# Patient Record
Sex: FEMALE | Race: WHITE | NOT HISPANIC OR LATINO | Employment: FULL TIME | URBAN - METROPOLITAN AREA
[De-identification: names, ages, dates, MRNs, and addresses within clinical notes are randomized per-mention and may not be internally consistent; named-entity substitution may affect disease eponyms.]

---

## 2017-01-24 ENCOUNTER — TELEPHONE (OUTPATIENT)
Dept: NEUROLOGY | Facility: CLINIC | Age: 42
End: 2017-01-24

## 2017-01-24 ENCOUNTER — COMMUNICATION - HEALTHEAST (OUTPATIENT)
Dept: NEUROLOGY | Facility: CLINIC | Age: 42
End: 2017-01-24

## 2017-01-24 DIAGNOSIS — S06.9XAA TBI (TRAUMATIC BRAIN INJURY) (H): ICD-10-CM

## 2017-01-24 NOTE — TELEPHONE ENCOUNTER
PREVISIT INFORMATION                                                    Carlie YAIR Whitaker scheduled for future visit at [facility].    Patient is scheduled to see Maryana Gallagher MD on 1/26/17  Reason for visit: Concussion 12/12/16, history of TBI  Referring provider ER provider  Has patient seen previous specialist? ED record says she's seeing TBI specialist at [facility].  Medical Records:  Unable to reach patient to ask about medical records.    REVIEW                                                        Current Outpatient Prescriptions   Medication Sig Dispense Refill     Lisdexamfetamine Dimesylate (VYVANSE PO) Take 30 mg by mouth daily       HYDROcodone-acetaminophen (NORCO) 5-325 MG per tablet Take 1-2 tablets by mouth every 4 hours as needed for moderate to severe pain 10 tablet 0       Allergies: Erythromycin        PLAN/FOLLOW-UP NEEDED                                                      Previsit review incomplete.  Listed phone number is out of service.  Patient will see provider at future scheduled appointment and we will attempt to get records at that time.

## 2017-02-28 ENCOUNTER — COMMUNICATION - HEALTHEAST (OUTPATIENT)
Dept: NEUROLOGY | Facility: CLINIC | Age: 42
End: 2017-02-28

## 2017-02-28 DIAGNOSIS — S06.9XAA TBI (TRAUMATIC BRAIN INJURY) (H): ICD-10-CM

## 2017-03-09 ENCOUNTER — AMBULATORY - HEALTHEAST (OUTPATIENT)
Dept: SPEECH THERAPY | Age: 42
End: 2017-03-09

## 2017-03-23 ENCOUNTER — HOSPITAL ENCOUNTER (OUTPATIENT)
Dept: NEUROLOGY | Facility: CLINIC | Age: 42
Setting detail: THERAPIES SERIES
Discharge: STILL ADMITTED OR ADMINISTRATIVE | End: 2017-03-23
Attending: NURSE PRACTITIONER

## 2017-03-23 DIAGNOSIS — F41.1 ANXIETY STATE: ICD-10-CM

## 2017-03-23 DIAGNOSIS — F07.81 POST CONCUSSION SYNDROME: ICD-10-CM

## 2017-03-23 DIAGNOSIS — F09 COGNITIVE DISORDER: ICD-10-CM

## 2017-03-23 DIAGNOSIS — F39 EPISODIC MOOD DISORDER (H): ICD-10-CM

## 2017-03-23 DIAGNOSIS — G47.00 INSOMNIA, UNSPECIFIED: ICD-10-CM

## 2017-03-30 ENCOUNTER — COMMUNICATION - HEALTHEAST (OUTPATIENT)
Dept: NEUROLOGY | Facility: CLINIC | Age: 42
End: 2017-03-30

## 2017-03-30 DIAGNOSIS — S06.9XAA TBI (TRAUMATIC BRAIN INJURY) (H): ICD-10-CM

## 2017-04-25 ENCOUNTER — COMMUNICATION - HEALTHEAST (OUTPATIENT)
Dept: NEUROLOGY | Facility: CLINIC | Age: 42
End: 2017-04-25

## 2017-04-25 DIAGNOSIS — G47.00 INSOMNIA, UNSPECIFIED: ICD-10-CM

## 2017-04-26 ENCOUNTER — COMMUNICATION - HEALTHEAST (OUTPATIENT)
Dept: NEUROLOGY | Facility: CLINIC | Age: 42
End: 2017-04-26

## 2017-04-26 DIAGNOSIS — S06.9XAA TBI (TRAUMATIC BRAIN INJURY) (H): ICD-10-CM

## 2017-05-02 ENCOUNTER — AMBULATORY - HEALTHEAST (OUTPATIENT)
Dept: NEUROLOGY | Facility: CLINIC | Age: 42
End: 2017-05-02

## 2017-05-27 ENCOUNTER — APPOINTMENT (OUTPATIENT)
Dept: CT IMAGING | Facility: CLINIC | Age: 42
End: 2017-05-27
Attending: FAMILY MEDICINE
Payer: COMMERCIAL

## 2017-05-27 ENCOUNTER — HOSPITAL ENCOUNTER (EMERGENCY)
Facility: CLINIC | Age: 42
Discharge: HOME | End: 2017-05-27
Attending: FAMILY MEDICINE | Admitting: FAMILY MEDICINE
Payer: COMMERCIAL

## 2017-05-27 ENCOUNTER — APPOINTMENT (OUTPATIENT)
Dept: GENERAL RADIOLOGY | Facility: CLINIC | Age: 42
End: 2017-05-27
Attending: FAMILY MEDICINE
Payer: COMMERCIAL

## 2017-05-27 VITALS
TEMPERATURE: 98.1 F | HEART RATE: 94 BPM | RESPIRATION RATE: 16 BRPM | SYSTOLIC BLOOD PRESSURE: 99 MMHG | OXYGEN SATURATION: 99 % | DIASTOLIC BLOOD PRESSURE: 59 MMHG

## 2017-05-27 DIAGNOSIS — E87.6 HYPOKALEMIA: ICD-10-CM

## 2017-05-27 DIAGNOSIS — N10 ACUTE PYELONEPHRITIS: ICD-10-CM

## 2017-05-27 DIAGNOSIS — M54.59 MECHANICAL LOW BACK PAIN: ICD-10-CM

## 2017-05-27 DIAGNOSIS — E86.0 DEHYDRATION: ICD-10-CM

## 2017-05-27 LAB
ALBUMIN SERPL-MCNC: 2.2 G/DL (ref 3.4–5)
ALBUMIN UR-MCNC: 30 MG/DL
ALP SERPL-CCNC: 115 U/L (ref 40–150)
ALT SERPL W P-5'-P-CCNC: 19 U/L (ref 0–50)
ANION GAP SERPL CALCULATED.3IONS-SCNC: 7 MMOL/L (ref 3–14)
APPEARANCE UR: ABNORMAL
AST SERPL W P-5'-P-CCNC: 19 U/L (ref 0–45)
BACTERIA #/AREA URNS HPF: ABNORMAL /HPF
BASOPHILS # BLD AUTO: 0 10E9/L (ref 0–0.2)
BASOPHILS NFR BLD AUTO: 0.2 %
BILIRUB SERPL-MCNC: 0.2 MG/DL (ref 0.2–1.3)
BILIRUB UR QL STRIP: NEGATIVE
BUN SERPL-MCNC: 14 MG/DL (ref 7–30)
CALCIUM SERPL-MCNC: 9.4 MG/DL (ref 8.5–10.1)
CHLORIDE SERPL-SCNC: 97 MMOL/L (ref 94–109)
CO2 SERPL-SCNC: 32 MMOL/L (ref 20–32)
COLOR UR AUTO: YELLOW
CREAT SERPL-MCNC: 1.15 MG/DL (ref 0.52–1.04)
DIFFERENTIAL METHOD BLD: ABNORMAL
EOSINOPHIL # BLD AUTO: 0.1 10E9/L (ref 0–0.7)
EOSINOPHIL NFR BLD AUTO: 0.8 %
ERYTHROCYTE [DISTWIDTH] IN BLOOD BY AUTOMATED COUNT: 12.1 % (ref 10–15)
GFR SERPL CREATININE-BSD FRML MDRD: 52 ML/MIN/1.7M2
GLUCOSE SERPL-MCNC: 111 MG/DL (ref 70–99)
GLUCOSE UR STRIP-MCNC: NEGATIVE MG/DL
HCG UR QL: NEGATIVE
HCT VFR BLD AUTO: 34.6 % (ref 35–47)
HGB BLD-MCNC: 11.4 G/DL (ref 11.7–15.7)
HGB UR QL STRIP: NEGATIVE
IMM GRANULOCYTES # BLD: 0.1 10E9/L (ref 0–0.4)
IMM GRANULOCYTES NFR BLD: 0.5 %
KETONES UR STRIP-MCNC: NEGATIVE MG/DL
LEUKOCYTE ESTERASE UR QL STRIP: ABNORMAL
LIPASE SERPL-CCNC: 89 U/L (ref 73–393)
LYMPHOCYTES # BLD AUTO: 1.4 10E9/L (ref 0.8–5.3)
LYMPHOCYTES NFR BLD AUTO: 11.2 %
MCH RBC QN AUTO: 29.5 PG (ref 26.5–33)
MCHC RBC AUTO-ENTMCNC: 32.9 G/DL (ref 31.5–36.5)
MCV RBC AUTO: 89 FL (ref 78–100)
MONOCYTES # BLD AUTO: 1.9 10E9/L (ref 0–1.3)
MONOCYTES NFR BLD AUTO: 15.4 %
MUCOUS THREADS #/AREA URNS LPF: PRESENT /LPF
NEUTROPHILS # BLD AUTO: 9 10E9/L (ref 1.6–8.3)
NEUTROPHILS NFR BLD AUTO: 71.9 %
NITRATE UR QL: NEGATIVE
PH UR STRIP: 6.5 PH (ref 5–7)
PLATELET # BLD AUTO: 301 10E9/L (ref 150–450)
POTASSIUM SERPL-SCNC: 2.8 MMOL/L (ref 3.4–5.3)
POTASSIUM SERPL-SCNC: 3.2 MMOL/L (ref 3.4–5.3)
PROT SERPL-MCNC: 6 G/DL (ref 6.8–8.8)
RBC # BLD AUTO: 3.87 10E12/L (ref 3.8–5.2)
RBC #/AREA URNS AUTO: 3 /HPF (ref 0–2)
SODIUM SERPL-SCNC: 136 MMOL/L (ref 133–144)
SP GR UR STRIP: 1.01 (ref 1–1.03)
SQUAMOUS #/AREA URNS AUTO: 18 /HPF (ref 0–1)
URN SPEC COLLECT METH UR: ABNORMAL
UROBILINOGEN UR STRIP-MCNC: NORMAL MG/DL (ref 0–2)
WBC # BLD AUTO: 12.5 10E9/L (ref 4–11)
WBC #/AREA URNS AUTO: 25 /HPF (ref 0–2)

## 2017-05-27 PROCEDURE — 84132 ASSAY OF SERUM POTASSIUM: CPT | Performed by: FAMILY MEDICINE

## 2017-05-27 PROCEDURE — 99285 EMERGENCY DEPT VISIT HI MDM: CPT | Performed by: FAMILY MEDICINE

## 2017-05-27 PROCEDURE — 87186 SC STD MICRODIL/AGAR DIL: CPT | Performed by: FAMILY MEDICINE

## 2017-05-27 PROCEDURE — 96365 THER/PROPH/DIAG IV INF INIT: CPT

## 2017-05-27 PROCEDURE — 25000128 H RX IP 250 OP 636: Performed by: FAMILY MEDICINE

## 2017-05-27 PROCEDURE — 83690 ASSAY OF LIPASE: CPT | Performed by: FAMILY MEDICINE

## 2017-05-27 PROCEDURE — 96366 THER/PROPH/DIAG IV INF ADDON: CPT

## 2017-05-27 PROCEDURE — 74176 CT ABD & PELVIS W/O CONTRAST: CPT

## 2017-05-27 PROCEDURE — 85025 COMPLETE CBC W/AUTO DIFF WBC: CPT | Performed by: FAMILY MEDICINE

## 2017-05-27 PROCEDURE — 80053 COMPREHEN METABOLIC PANEL: CPT | Performed by: FAMILY MEDICINE

## 2017-05-27 PROCEDURE — 96375 TX/PRO/DX INJ NEW DRUG ADDON: CPT

## 2017-05-27 PROCEDURE — 71020 XR CHEST 2 VW: CPT

## 2017-05-27 PROCEDURE — 81025 URINE PREGNANCY TEST: CPT | Performed by: FAMILY MEDICINE

## 2017-05-27 PROCEDURE — 81001 URINALYSIS AUTO W/SCOPE: CPT | Performed by: FAMILY MEDICINE

## 2017-05-27 PROCEDURE — 99285 EMERGENCY DEPT VISIT HI MDM: CPT | Mod: 25

## 2017-05-27 PROCEDURE — 87088 URINE BACTERIA CULTURE: CPT | Performed by: FAMILY MEDICINE

## 2017-05-27 PROCEDURE — 25000132 ZZH RX MED GY IP 250 OP 250 PS 637: Performed by: FAMILY MEDICINE

## 2017-05-27 PROCEDURE — 87086 URINE CULTURE/COLONY COUNT: CPT | Performed by: FAMILY MEDICINE

## 2017-05-27 RX ORDER — CEFTRIAXONE 1 G/1
1 INJECTION, POWDER, FOR SOLUTION INTRAMUSCULAR; INTRAVENOUS EVERY 24 HOURS
Status: DISCONTINUED | OUTPATIENT
Start: 2017-05-27 | End: 2017-05-27 | Stop reason: HOSPADM

## 2017-05-27 RX ORDER — POTASSIUM CHLORIDE 1.5 G/1.58G
20-40 POWDER, FOR SOLUTION ORAL
Status: DISCONTINUED | OUTPATIENT
Start: 2017-05-27 | End: 2017-05-27 | Stop reason: HOSPADM

## 2017-05-27 RX ORDER — CIPROFLOXACIN 500 MG/1
500 TABLET, FILM COATED ORAL 2 TIMES DAILY
COMMUNITY
Start: 2017-05-25 | End: 2017-06-04

## 2017-05-27 RX ORDER — POTASSIUM CL/LIDO/0.9 % NACL 10MEQ/0.1L
10 INTRAVENOUS SOLUTION, PIGGYBACK (ML) INTRAVENOUS
Status: DISCONTINUED | OUTPATIENT
Start: 2017-05-27 | End: 2017-05-27 | Stop reason: HOSPADM

## 2017-05-27 RX ORDER — LORAZEPAM 0.5 MG/1
.5-1 TABLET ORAL
COMMUNITY
Start: 2017-04-28

## 2017-05-27 RX ORDER — TRAZODONE HYDROCHLORIDE 50 MG/1
1 TABLET, FILM COATED ORAL AT BEDTIME
COMMUNITY
Start: 2017-03-23

## 2017-05-27 RX ORDER — KETOROLAC TROMETHAMINE 15 MG/ML
15 INJECTION, SOLUTION INTRAMUSCULAR; INTRAVENOUS ONCE
Status: COMPLETED | OUTPATIENT
Start: 2017-05-27 | End: 2017-05-27

## 2017-05-27 RX ADMIN — SODIUM CHLORIDE, POTASSIUM CHLORIDE, SODIUM LACTATE AND CALCIUM CHLORIDE 1000 ML: 600; 310; 30; 20 INJECTION, SOLUTION INTRAVENOUS at 14:00

## 2017-05-27 RX ADMIN — POTASSIUM CHLORIDE 40 MEQ: 1.5 POWDER, FOR SOLUTION ORAL at 16:38

## 2017-05-27 RX ADMIN — CEFTRIAXONE 1 G: 1 INJECTION, POWDER, FOR SOLUTION INTRAMUSCULAR; INTRAVENOUS at 16:49

## 2017-05-27 RX ADMIN — KETOROLAC TROMETHAMINE 15 MG: 15 INJECTION, SOLUTION INTRAMUSCULAR; INTRAVENOUS at 16:11

## 2017-05-27 NOTE — ED PROVIDER NOTES
"  History     Chief Complaint   Patient presents with     Flank Pain     left sided flank pain, on antibiotiucs for UTI. no improvement.     HPI  Carlie Montejo is a 41 year old female with a history of a pelvic fracture and chronic pain (on Percocet) who presents to the ED for evaluation of left lower back and flank pain. The patient reports that pain began in her left lower back six days ago. This pain has since traveled to her left flank region. She continues to experience chills, a fever, tachycardia, shortness of breath, and chest tightness. Her temperature was being checked at home and was found to be 102 degrees at its highest and about 100 degrees at its lowest last night. The patient reports that she feels dehydrated but will wake up in the morning \"wet with sweat\". She also states that she occasionally will feel like she has to urinate and then isn't able to go. The patient adds that she has not had a bowel movement for four days; she gave herself an enema to go at that time. She began taking Cipro one week ago and has had five doses. Her LMP began on 5/12/17 and was on time and normal.The patient denies any previous abdominal surgeries.     Past Medical History   No past medical history on file.    Problem List  There is no problem list on file for this patient.      Past Surgical History  No past surgical history on file.    Social History  Social History     Social History     Marital status:      Spouse name: N/A     Number of children: N/A     Years of education: N/A     Occupational History     Not on file.     Social History Main Topics     Smoking status: Not on file     Smokeless tobacco: Not on file     Alcohol use Not on file     Drug use: Not on file     Sexual activity: Not on file     Other Topics Concern     Not on file     Social History Narrative     I have reviewed the Medications, Allergies, Past Medical and Surgical History, and Social History in the Epic system.    Review of " Systems     All other systems are reviewed and are negative    Physical Exam   BP: 97/63  Pulse: 94  Temp: 98.1  F (36.7  C)  Resp: 18  SpO2: 97 %  Physical Exam     Nursing note and vitals were reviewed.  Constitutional: Awake and alert, thin but otherwise appearing 41-year-old in moderate discomfort, who does not appear acutely ill, and who answers questions appropriately and cooperates with examination.  HEENT: EOMI. Oropharynx normal.  Neck: Freely mobile.  Cardiovascular: Cardiac examination reveals normal heart rate and regular rhythm without murmur.  Pulmonary/Chest: Breathing is unlabored.  Breath sounds are clear and equal bilaterally.  There no retractions, tachypnea, rales, wheezes, or rhonchi.  Abdomen: Soft, nontender, no HSM or masses rebound or guarding.  No CVA tenderness.  Musculoskeletal: Extremities are warm and well-perfused and without edema.. Back examination reveals full range of motion in 6 directions with discomfort on range of motion in all directions felt in the left flank.  Spinal curvatures are normal.  No tenderness to palpation or percussion.  Neurological: Alert, oriented, thought content logical, coherent   Skin: Warm, dry, no rashes.  Psychiatric: Affect broad and appropriate.        ED Course     ED Course     Procedures             Critical Care time:  none                    Results for orders placed or performed during the hospital encounter of 05/27/17 (from the past 24 hour(s))   UA reflex to Microscopic   Result Value Ref Range    Color Urine Yellow     Appearance Urine Slightly Cloudy     Glucose Urine Negative NEG mg/dL    Bilirubin Urine Negative NEG    Ketones Urine Negative NEG mg/dL    Specific Gravity Urine 1.014 1.003 - 1.035    Blood Urine Negative NEG    pH Urine 6.5 5.0 - 7.0 pH    Protein Albumin Urine 30 (A) NEG mg/dL    Urobilinogen mg/dL Normal 0.0 - 2.0 mg/dL    Nitrite Urine Negative NEG    Leukocyte Esterase Urine Moderate (A) NEG    Source Unspecified Urine      RBC Urine 3 (H) 0 - 2 /HPF    WBC Urine 25 (H) 0 - 2 /HPF    Bacteria Urine Few (A) NEG /HPF    Squamous Epithelial /HPF Urine 18 (H) 0 - 1 /HPF    Mucous Urine Present (A) NEG /LPF   HCG qualitative urine   Result Value Ref Range    HCG Qual Urine Negative NEG   CBC with platelets differential   Result Value Ref Range    WBC 12.5 (H) 4.0 - 11.0 10e9/L    RBC Count 3.87 3.8 - 5.2 10e12/L    Hemoglobin 11.4 (L) 11.7 - 15.7 g/dL    Hematocrit 34.6 (L) 35.0 - 47.0 %    MCV 89 78 - 100 fl    MCH 29.5 26.5 - 33.0 pg    MCHC 32.9 31.5 - 36.5 g/dL    RDW 12.1 10.0 - 15.0 %    Platelet Count 301 150 - 450 10e9/L    Diff Method Automated Method     % Neutrophils 71.9 %    % Lymphocytes 11.2 %    % Monocytes 15.4 %    % Eosinophils 0.8 %    % Basophils 0.2 %    % Immature Granulocytes 0.5 %    Absolute Neutrophil 9.0 (H) 1.6 - 8.3 10e9/L    Absolute Lymphocytes 1.4 0.8 - 5.3 10e9/L    Absolute Monocytes 1.9 (H) 0.0 - 1.3 10e9/L    Absolute Eosinophils 0.1 0.0 - 0.7 10e9/L    Absolute Basophils 0.0 0.0 - 0.2 10e9/L    Abs Immature Granulocytes 0.1 0 - 0.4 10e9/L   Comprehensive metabolic panel   Result Value Ref Range    Sodium 136 133 - 144 mmol/L    Potassium 2.8 (L) 3.4 - 5.3 mmol/L    Chloride 97 94 - 109 mmol/L    Carbon Dioxide 32 20 - 32 mmol/L    Anion Gap 7 3 - 14 mmol/L    Glucose 111 (H) 70 - 99 mg/dL    Urea Nitrogen 14 7 - 30 mg/dL    Creatinine 1.15 (H) 0.52 - 1.04 mg/dL    GFR Estimate 52 (L) >60 mL/min/1.7m2    GFR Estimate If Black 63 >60 mL/min/1.7m2    Calcium 9.4 8.5 - 10.1 mg/dL    Bilirubin Total 0.2 0.2 - 1.3 mg/dL    Albumin 2.2 (L) 3.4 - 5.0 g/dL    Protein Total 6.0 (L) 6.8 - 8.8 g/dL    Alkaline Phosphatase 115 40 - 150 U/L    ALT 19 0 - 50 U/L    AST 19 0 - 45 U/L   Lipase   Result Value Ref Range    Lipase 89 73 - 393 U/L   CT Abdomen Pelvis w/o Contrast (stone protocol)    Narrative    Exam: CT ABDOMEN PELVIS W/O CONTRAST  5/27/2017 3:11 PM    History: Left flank pain.    Comparison:  None    Technique: Volumetric acquisition with reconstruction in the axial,  coronal planes through the abdomen and pelvis without contrast.  Radiation dose for this scan was reduced using automated exposure  control, adjustment of the mA and/or kV according to patient size, or  iterative reconstruction technique.    Contrast: None    Findings:   Lung Bases: Mild dependent atelectasis. No pleural or pericardial  effusion.    Abdomen: Unenhanced liver, spleen, adrenal glands, pancreas are  normal. Gallbladder is decompressed.    Cyst in the posterior interpolar region of the right kidney, this cyst  contains layering stones. Kidneys are otherwise normal. Specifically,  no renal or ureteral calculi, no hydronephrosis or hydroureter.    Small amount of free fluid in the pelvis. No areas of bowel wall  thickening or bowel dilatation.    Bones: No concerning lytic or sclerotic lesions. Postoperative changes  of left sacroiliac arthrodesis. Surgical screw appears intact.      Impression    Impression: Small amount of free fluid in the pelvis, likely  physiologic. Otherwise normal unenhanced CT of the abdomen and pelvis  without findings to suggest an etiology of the patient's symptoms.    JOSE GUADALUPE QUINTANA   XR Chest 2 Views    Narrative    XR CHEST 2 VW   5/27/2017 4:30 PM     HISTORY: fever, dyspnea    COMPARISON: None.    FINDINGS: The heart is negative.  There is slight blunting of the  costophrenic angles on the lateral view. This may be due to a small  amount of pleural fluid. Mild platelike atelectasis is seen in the  lung bases. The pulmonary vasculature is normal.  The bones and soft  tissues are unremarkable.      Impression    IMPRESSION:   1. Slight blunting of the costophrenic angles, suspect small amount of  pleural fluid.  2. Mild basilar platelike atelectasis.    This can also be seen on the  recent chest CT.   Potassium   Result Value Ref Range    Potassium 3.2 (L) 3.4 - 5.3 mmol/L       Medications   lactated  ringers BOLUS 1,000 mL (0 mLs Intravenous Stopped 5/27/17 1609)     Followed by   lactated ringers BOLUS 1,000 mL (not administered)   potassium chloride (KLOR-CON) Packet 20-40 mEq (40 mEq Oral or Feeding Tube Given 5/27/17 1638)   potassium chloride 10 mEq in 100 mL intermittent infusion with 10 mg lidocaine (not administered)   cefTRIAXone (ROCEPHIN) 1 g vial to attach to  mL bag for ADULTS or NS 50 mL bag for PEDS (0 g Intravenous Stopped 5/27/17 1731)   ketorolac (TORADOL) injection 15 mg (15 mg Intravenous Given 5/27/17 1611)       13:23 PM Patient assessed. Course of care outlined.  1600: Patient reassessed.  Symptoms largely unchanged.  Test results reviewed.  Urine collection is of poor quality with multiple squamous epithelial cells but there is still leukocyte esterase and pyuria.  CT scan shows no evidence of pyelonephritis or obstructing kidney stone.  Discussed findings of nonobstructive kidney stones in the right renal cyst.  She does have some shortness of breath and has been having persistent fevers and I think we should expand the evaluation and to include a chest x-ray to rule out pneumonia. I discussed with her that I still believe that she has myofascial back pain in addition to probably a kidney infection but there is evidence that that is improving.     She also has mild hypokalemia and says she has been having leg cramps.  She denies that she's been dieting excessively.  She says she has not been eating as much because of work stress.  She has not been having vomiting or diarrhea.  She says she has not had problems with low potassium in the past but then also states that she could tell her potassium was low because she was having leg cramps.  We will give her potassium supplements and additional IV fluids.     Assessments & Plan (with Medical Decision Making)     41-year-old female presented with persistent left flank pain in the setting of recent possible urinary tract infection.   Unfortunately urinalyses have been a poor quality with high amounts of squamous epithelial cells and no culture had been performed.  There is no CT evidence of pyelonephritis and no evidence of obstruction of the urinary tract.  There is incidental finding of kidney stones in the right renal cyst unrelated to current complaints.  There is clearly a myofascial component to the back pain with pain elicited by range of motion.  Symptomatic care was outlined for that with Tylenol and ibuprofen.  I do not find evidence of treatment failure for pyelonephritis, if it is present.  We did obtain urine for another culture.  She also has hypokalemia.  She says that she's had poor oral intake and thinks she's been dehydrated from not eating enough.  Her ADHD medication may be suppressing her appetite excessively.  I recommended a balanced diet and high potassium foods.  She received potassium supplements here.  Her potassium improved from 2.8 to 3.2.  I've asked her to have it rechecked at the end of next week in her primary care clinic.  She received a dose of Rocephin on the emergency department for possible urinary tract infection and will continue on ciprofloxacin, of which she has an additional 7 day course.  She should be seen if she has fevers about 100.4 after 48 hours or other new concerning symptoms.    I have reviewed the nursing notes.    I have reviewed the findings, diagnosis, plan and need for follow up with the patient.    Discharge Medication List as of 5/27/2017  6:30 PM          Final diagnoses:   Mechanical low back pain   Dehydration   Hypokalemia   Acute pyelonephritis     This document serves as a record of the services and decisions personally performed and made by Wenceslao Fregoso MD. It was created on HIS/HER behalf by Giovana Vale, a trained medical scribe. The creation of this document is based the provider's statements to the medical scribe.  Giovana Vale 1:22 PM 5/27/2017    Provider:   The  information in this document, created by the medical scribe for me, accurately reflects the services I personally performed and the decisions made by me. I have reviewed and approved this document for accuracy prior to leaving the patient care area.  Wenceslao Fregoso MD 1:22 PM 5/27/2017 5/27/2017   [facility] EMERGENCY DEPARTMENT     Wenceslao Fregoso MD  05/27/17 184

## 2017-05-27 NOTE — ED AVS SNAPSHOT
[facility] Emergency Department    [address] [address]    Phone:  [phone]    Fax:  [phone]                                       Carlie Montejo   MRN: [id number]    Department:  [facility] Emergency Department   Date of Visit:  5/27/2017           Patient Information     Date Of Birth          1975        Your diagnoses for this visit were:     Mechanical low back pain     Dehydration     Hypokalemia     Acute pyelonephritis        You were seen by Wenceslao Fregoso MD.        Discharge Instructions       Continue your ciprofloxacin twice daily for 7 more days  Use ibuprofen 400-600 mg 4 times per day.  You may alternate with acetaminophen 1000 mg 4 times per day if needed for back pain.  Consider physical therapy if back pain not resolved in one to 2 weeks.  Return to be seen if fevers above 100.4 after 48 hours or other new or worsening symptoms.  Have your potassium rechecked next week.  Eat a balanced diet with potassium rich foods. See attached information.    Discharge References/Attachments     DIET: HIGH POTASSIUM, DISCHARGE INSTRUCTIONS (ENGLISH)      24 Hour Appointment Hotline       To make an appointment at any [facility] clinic, call [phone] ([phone]). If you don't have a family doctor or clinic, we will help you find one. [facility] clinics are conveniently located to serve the needs of you and your family.             Review of your medicines      Our records show that you are taking the medicines listed below. If these are incorrect, please call your family doctor or clinic.        Dose / Directions Last dose taken    BIOTIN PO   Dose:  1000 mcg        Take 1,000 mcg by mouth daily chewable   Refills:  0        CALCIUM PO   Dose:  1 tablet        Take 1 tablet by mouth daily   Refills:  0        ciprofloxacin 500 MG tablet   Commonly known as:  CIPRO   Dose:  500 mg        Take 500 mg by mouth 2 times daily   Refills:  0         HYDROcodone-acetaminophen 5-325 MG per tablet   Commonly known as:  NORCO   Dose:  1-2 tablet   Quantity:  10 tablet        Take 1-2 tablets by mouth every 4 hours as needed for moderate to severe pain   Refills:  0        IBUPROFEN PO   Dose:  800 mg        Take 800 mg by mouth every 6 hours as needed for moderate pain   Refills:  0        LORazepam 0.5 MG tablet   Commonly known as:  ATIVAN   Dose:  0.5-1 tablet        Take 0.5-1 tablets by mouth nightly as needed   Refills:  0        traZODone 50 MG tablet   Commonly known as:  DESYREL   Dose:  1 tablet        Take 1 tablet by mouth At Bedtime   Refills:  0        VYVANSE PO   Dose:  30 mg        Take 30 mg by mouth daily   Refills:  0                Procedures and tests performed during your visit     CBC with platelets differential    CT Abdomen Pelvis w/o Contrast (stone protocol)    Comprehensive metabolic panel    HCG qualitative urine    Lipase    Potassium    UA reflex to Microscopic    Urine Culture    XR Chest 2 Views      Orders Needing Specimen Collection     None      Pending Results     Date and Time Order Name Status Description    5/27/2017 1605 XR Chest 2 Views Preliminary     5/27/2017 1331 Urine Culture In process             Pending Culture Results     Date and Time Order Name Status Description    5/27/2017 1331 Urine Culture In process             Pending Results Instructions     If you had any lab results that were not finalized at the time of your Discharge, you can call the ED Lab Result RN at [phone]. You will be contacted by this team for any positive Lab results or changes in treatment. The nurses are available 7 days a week from 10A to 6:30P.  You can leave a message 24 hours per day and they will return your call.        Test Results From Your Hospital Stay        5/27/2017  2:43 PM      Component Results     Component Value Ref Range & Units Status    WBC 12.5 (H) 4.0 - 11.0 10e9/L Final    RBC Count 3.87 3.8 - 5.2 10e12/L Final     Hemoglobin 11.4 (L) 11.7 - 15.7 g/dL Final    Hematocrit 34.6 (L) 35.0 - 47.0 % Final    MCV 89 78 - 100 fl Final    MCH 29.5 26.5 - 33.0 pg Final    MCHC 32.9 31.5 - 36.5 g/dL Final    RDW 12.1 10.0 - 15.0 % Final    Platelet Count 301 150 - 450 10e9/L Final    Diff Method Automated Method  Final    % Neutrophils 71.9 % Final    % Lymphocytes 11.2 % Final    % Monocytes 15.4 % Final    % Eosinophils 0.8 % Final    % Basophils 0.2 % Final    % Immature Granulocytes 0.5 % Final    Absolute Neutrophil 9.0 (H) 1.6 - 8.3 10e9/L Final    Absolute Lymphocytes 1.4 0.8 - 5.3 10e9/L Final    Absolute Monocytes 1.9 (H) 0.0 - 1.3 10e9/L Final    Absolute Eosinophils 0.1 0.0 - 0.7 10e9/L Final    Absolute Basophils 0.0 0.0 - 0.2 10e9/L Final    Abs Immature Granulocytes 0.1 0 - 0.4 10e9/L Final         5/27/2017  3:00 PM      Component Results     Component Value Ref Range & Units Status    Sodium 136 133 - 144 mmol/L Final    Potassium 2.8 (L) 3.4 - 5.3 mmol/L Final    Chloride 97 94 - 109 mmol/L Final    Carbon Dioxide 32 20 - 32 mmol/L Final    Anion Gap 7 3 - 14 mmol/L Final    Glucose 111 (H) 70 - 99 mg/dL Final    Urea Nitrogen 14 7 - 30 mg/dL Final    Creatinine 1.15 (H) 0.52 - 1.04 mg/dL Final    GFR Estimate 52 (L) >60 mL/min/1.7m2 Final    Non  GFR Calc    GFR Estimate If Black 63 >60 mL/min/1.7m2 Final    African American GFR Calc    Calcium 9.4 8.5 - 10.1 mg/dL Final    Bilirubin Total 0.2 0.2 - 1.3 mg/dL Final    Albumin 2.2 (L) 3.4 - 5.0 g/dL Final    Protein Total 6.0 (L) 6.8 - 8.8 g/dL Final    Alkaline Phosphatase 115 40 - 150 U/L Final    ALT 19 0 - 50 U/L Final    AST 19 0 - 45 U/L Final         5/27/2017  2:57 PM      Component Results     Component Value Ref Range & Units Status    Lipase 89 73 - 393 U/L Final         5/27/2017  2:52 PM      Component Results     Component Value Ref Range & Units Status    Color Urine Yellow  Final    Appearance Urine Slightly Cloudy  Final    Glucose  Urine Negative NEG mg/dL Final    Bilirubin Urine Negative NEG Final    Ketones Urine Negative NEG mg/dL Final    Specific Gravity Urine 1.014 1.003 - 1.035 Final    Blood Urine Negative NEG Final    pH Urine 6.5 5.0 - 7.0 pH Final    Protein Albumin Urine 30 (A) NEG mg/dL Final    Urobilinogen mg/dL Normal 0.0 - 2.0 mg/dL Final    Nitrite Urine Negative NEG Final    Leukocyte Esterase Urine Moderate (A) NEG Final    Source Unspecified Urine  Final    RBC Urine 3 (H) 0 - 2 /HPF Final    WBC Urine 25 (H) 0 - 2 /HPF Final    Bacteria Urine Few (A) NEG /HPF Final    Squamous Epithelial /HPF Urine 18 (H) 0 - 1 /HPF Final    Mucous Urine Present (A) NEG /LPF Final         5/27/2017  2:40 PM         5/27/2017  2:46 PM      Component Results     Component Value Ref Range & Units Status    HCG Qual Urine Negative NEG Final         5/27/2017  3:28 PM      Narrative     Exam: CT ABDOMEN PELVIS W/O CONTRAST  5/27/2017 3:11 PM    History: Left flank pain.    Comparison: None    Technique: Volumetric acquisition with reconstruction in the axial,  coronal planes through the abdomen and pelvis without contrast.  Radiation dose for this scan was reduced using automated exposure  control, adjustment of the mA and/or kV according to patient size, or  iterative reconstruction technique.    Contrast: None    Findings:   Lung Bases: Mild dependent atelectasis. No pleural or pericardial  effusion.    Abdomen: Unenhanced liver, spleen, adrenal glands, pancreas are  normal. Gallbladder is decompressed.    Cyst in the posterior interpolar region of the right kidney, this cyst  contains layering stones. Kidneys are otherwise normal. Specifically,  no renal or ureteral calculi, no hydronephrosis or hydroureter.    Small amount of free fluid in the pelvis. No areas of bowel wall  thickening or bowel dilatation.    Bones: No concerning lytic or sclerotic lesions. Postoperative changes  of left sacroiliac arthrodesis. Surgical screw appears  "intact.        Impression     Impression: Small amount of free fluid in the pelvis, likely  physiologic. Otherwise normal unenhanced CT of the abdomen and pelvis  without findings to suggest an etiology of the patient's symptoms.    JOSE GUADALUPE QUINTANA         5/27/2017  4:34 PM      Narrative     XR CHEST 2 VW   5/27/2017 4:30 PM     HISTORY: fever, dyspnea    COMPARISON: None.    FINDINGS: The heart is negative.  There is slight blunting of the  costophrenic angles on the lateral view. This may be due to a small  amount of pleural fluid. Mild platelike atelectasis is seen in the  lung bases. The pulmonary vasculature is normal.  The bones and soft  tissues are unremarkable.        Impression     IMPRESSION:   1. Slight blunting of the costophrenic angles, suspect small amount of  pleural fluid.  2. Mild basilar platelike atelectasis.    This can also be seen on the  recent chest CT.         5/27/2017  6:15 PM      Component Results     Component Value Ref Range & Units Status    Potassium 3.2 (L) 3.4 - 5.3 mmol/L Final                Thank you for choosing [facility]       Thank you for choosing [facility] for your care. Our goal is always to provide you with excellent care. Hearing back from our patients is one way we can continue to improve our services. Please take a few minutes to complete the written survey that you may receive in the mail after you visit with us. Thank you!        "[employer]" Information     "[employer]" lets you send messages to your doctor, view your test results, renew your prescriptions, schedule appointments and more. To sign up, go to [email or web address]" . Click on \"Log in\" on the left side of the screen, which will take you to the Welcome page. Then click on \"Sign up Now\" on the right side of the page.     You will be asked to enter the access code listed below, as well as some personal information. Please follow the directions to create your username and password.     Your access code is: " [email or web address]  Expires: 2017  6:28 PM     Your access code will  in 90 days. If you need help or a new code, please call your [facility] clinic or [phone].        Care EveryWhere ID     This is your Care EveryWhere ID. This could be used by other organizations to access your [facility] medical records  [id number]        After Visit Summary       This is your record. Keep this with you and show to your community pharmacist(s) and doctor(s) at your next visit.

## 2017-05-27 NOTE — DISCHARGE INSTRUCTIONS
Continue your ciprofloxacin twice daily for 7 more days  Use ibuprofen 400-600 mg 4 times per day.  You may alternate with acetaminophen 1000 mg 4 times per day if needed for back pain.  Consider physical therapy if back pain not resolved in one to 2 weeks.  Return to be seen if fevers above 100.4 after 48 hours or other new or worsening symptoms.  Have your potassium rechecked next week.  Eat a balanced diet with potassium rich foods. See attached information.

## 2017-05-27 NOTE — ED AVS SNAPSHOT
[facility] Emergency Department    [address] [address]    Phone:  [phone]    Fax:  [phone]                                       Carlie Montejo   MRN: [id number]    Department:  [facility] Emergency Department   Date of Visit:  5/27/2017           After Visit Summary Signature Page     I have received my discharge instructions, and my questions have been answered. I have discussed any challenges I see with this plan with the nurse or doctor.    ..........................................................................................................................................  Patient/Patient Representative Signature      ..........................................................................................................................................  Patient Representative Print Name and Relationship to Patient    ..................................................               ................................................  Date                                            Time    ..........................................................................................................................................  Reviewed by Signature/Title    ...................................................              ..............................................  Date                                                            Time

## 2017-05-30 ENCOUNTER — COMMUNICATION - HEALTHEAST (OUTPATIENT)
Dept: NEUROLOGY | Facility: CLINIC | Age: 42
End: 2017-05-30

## 2017-05-30 ENCOUNTER — TELEPHONE (OUTPATIENT)
Dept: EMERGENCY MEDICINE | Facility: CLINIC | Age: 42
End: 2017-05-30

## 2017-05-30 DIAGNOSIS — S06.9XAA TBI (TRAUMATIC BRAIN INJURY) (H): ICD-10-CM

## 2017-05-30 LAB
BACTERIA SPEC CULT: ABNORMAL
MICRO REPORT STATUS: ABNORMAL
MICROORGANISM SPEC CULT: ABNORMAL
SPECIMEN SOURCE: ABNORMAL

## 2017-05-30 RX ORDER — CEPHALEXIN 500 MG/1
500 CAPSULE ORAL 4 TIMES DAILY
Qty: 40 CAPSULE | Refills: 0 | Status: CANCELLED | OUTPATIENT
Start: 2017-05-30 | End: 2017-06-09

## 2017-05-30 NOTE — TELEPHONE ENCOUNTER
"[facility] Emergency Department Lab result notification [Adult-Female]    [facility] ED lab result protocol used  Urine Culture Protcol    Reason for call  Notify of lab results, assess symptoms,  review ED providers recommendations/discharge instructions (if necessary) and advise per ED lab result f/u protocol    Lab Result (including Rx patient on, if applicable)  Final Urine Culture Report on 5/30/17  [place] ED discharge antibiotic: Ciprofloxacin (prior to ED visit)  #1. Bacteria, 10,000 to 50,000 colonies/mL Escherichia coli,  is [RESISTANT] to ED discharge antibiotic.   Change in treatment as per [place] ED Lab result protocol.  Information table from ED Provider visit on 05/27/2017  ED diagnosis Mechanical low back pain, Dehydration, Hypokalemia, Acute pyelonephritis   ED provider Wenceslao Fregoso MD   Symptoms reported at ED visit (Chief complaint, HPI) a 41 year old female with a history of a pelvic fracture and chronic pain (on Percocet) who presents to the ED for evaluation of left lower back and flank pain. The patient reports that pain began in her left lower back six days ago. This pain has since traveled to her left flank region. She continues to experience chills, a fever, tachycardia, shortness of breath, and chest tightness. Her temperature was being checked at home and was found to be 102 degrees at its highest and about 100 degrees at its lowest last night. The patient reports that she feels dehydrated but will wake up in the morning \"wet with sweat\". She also states that she occasionally will feel like she has to urinate and then isn't able to go. The patient adds that she has not had a bowel movement for four days; she gave herself an enema to go at that time. She began taking Cipro one week ago and has had five doses. Her LMP began on 5/12/17 and was on time and normal.The patient denies any previous abdominal surgeries.   ED providers Impression and Plan (applicable information) " -year-old female presented with persistent left flank pain in the setting of recent possible urinary tract infection.  Unfortunately urinalyses have been a poor quality with high amounts of squamous epithelial cells and no culture had been performed.  There is no CT evidence of pyelonephritis and no evidence of obstruction of the urinary tract.  There is incidental finding of kidney stones in the right renal cyst unrelated to current complaints.  There is clearly a myofascial component to the back pain with pain elicited by range of motion.  Symptomatic care was outlined for that with Tylenol and ibuprofen.  I do not find evidence of treatment failure for pyelonephritis, if it is present.  We did obtain urine for another culture.  She also has hypokalemia.  She says that she's had poor oral intake and thinks she's been dehydrated from not eating enough.  Her ADHD medication may be suppressing her appetite excessively.  I recommended a balanced diet and high potassium foods.  She received potassium supplements here.  Her potassium improved from 2.8 to 3.2.  I've asked her to have it rechecked at the end of next week in her primary care clinic.  She received a dose of Rocephin on the emergency department for possible urinary tract infection and will continue on ciprofloxacin, of which she has an additional 7 day course.  She should be seen if she has fevers about 100.4 after 48 hours or other new concerning symptoms   Significant Medical hx, if applicable Reviewed   Coumadin/Warfarin [Yes /No] no   Creatinine Level (mg/dl) 1.15   Creatinine clearance (ml/min), if applicable 50.7   Pregnant (Yes/No/NA) NO   Breastfeeding (Yes/No/NA) NO   Allergies Erythromycin   Weight, if applicable 49.86 (Dec. 2016)      RN Assessment (Patient s current Symptoms), include time called.  [Insert Left message here if message left]  At 1613 Left voicemail message requesting a call back to [phone] between 10 a.m. and 6:30 p.m., 7  days a week for patient's ED/UC lab results.  May leave a message 24/7, if no one available.       Tatum Calloway, RN  [facility] RN  Lung Nodule and ED Lab Result F/u RN  Epic pool (ED late result f/u RN): [id number]  FV INCIDENTAL RADIOLOGY F/U NURSES: P [id number]  # [phone]    Copy of Lab result   Exam Information   Exam Date Exam Time Accession # Results    5/27/17  1:13 PM [id number]    Component Results   Component Collected Lab   Specimen Description 05/27/2017  1:13 PM 59   Unspecified Urine   Culture Micro (Abnormal) 05/27/2017  1:13 PM 59   10,000 to 50,000 colonies/mL Escherichia coli   <10,000 colonies/mL Yeast      Micro Report Status 05/27/2017  1:13 PM 59   FINAL 05/30/2017   Organism: 05/27/2017  1:13 PM 59   10,000 to 50,000 colonies/mL Escherichia coli   Culture & Susceptibility   10,000 TO 50,000 COLONIES/ML ESCHERICHIA COLI (MARY KATE)   Antibiotic Sensitivity Unit Status   AMPICILLIN >=32 Resistant ug/mL Final   AMPICILLIN/SULBACTAM >=32 Resistant ug/mL Final   CEFAZOLIN <=4 Susceptible  Cefazolin MARY KATE breakpoints are for the treatment of uncomplicated urinary tract   infections.  For the treatment of systemic infections, please contact the   laboratory for additional testing. ug/mL Final   CEFEPIME <=1 Susceptible ug/mL Final   CEFOXITIN <=4 Susceptible ug/mL Final   CEFTAZIDIME <=1 Susceptible ug/mL Final   CEFTRIAXONE <=1 Susceptible ug/mL Final   CIPROFLOXACIN >=4 Resistant ug/mL Final   GENTAMICIN >=16 Resistant ug/mL Final   LEVOFLOXACIN >=8 Resistant ug/mL Final   NITROFURANTOIN <=16 Susceptible ug/mL Final   Piperacillin/Tazo 8 Susceptible ug/mL Final   TOBRAMYCIN >=16 Resistant ug/mL Final   Trimethoprim/Sulfa >=16/304 Resistant ug/mL Final

## 2017-05-30 NOTE — LETTER
May 31, 2017        Carlie Montejo  [address] [place] [id number]          Dear Carlie Montejo:    You were seen in the [facility] at [facility] EMERGENCY DEPARTMENT on 5/27/2017.  We are unable to reach you by phone, so we are sending you this letter.     It is important that you call [facility] Emergency Department lab F/u nurse at [phone] as we have to make some changes in your treatment.     Best time to call back is between 10 a.m. and 6 p.m.      Sincerely,         [facility] ED Lab F/u RN  [phone]

## 2017-06-22 ENCOUNTER — HOSPITAL ENCOUNTER (OUTPATIENT)
Dept: NEUROLOGY | Facility: CLINIC | Age: 42
Setting detail: THERAPIES SERIES
Discharge: STILL ADMITTED OR ADMINISTRATIVE | End: 2017-06-22
Attending: NURSE PRACTITIONER

## 2017-06-22 DIAGNOSIS — F09 COGNITIVE DISORDER: ICD-10-CM

## 2017-06-22 DIAGNOSIS — F39 EPISODIC MOOD DISORDER (H): ICD-10-CM

## 2017-06-22 DIAGNOSIS — G47.00 INSOMNIA, UNSPECIFIED: ICD-10-CM

## 2017-06-22 DIAGNOSIS — F41.1 ANXIETY STATE: ICD-10-CM

## 2017-06-22 DIAGNOSIS — S06.9XAA TBI (TRAUMATIC BRAIN INJURY) (H): ICD-10-CM

## 2017-07-13 ENCOUNTER — COMMUNICATION - HEALTHEAST (OUTPATIENT)
Dept: NEUROLOGY | Facility: CLINIC | Age: 42
End: 2017-07-13

## 2017-07-13 DIAGNOSIS — G47.00 INSOMNIA, UNSPECIFIED: ICD-10-CM

## 2017-07-31 ENCOUNTER — COMMUNICATION - HEALTHEAST (OUTPATIENT)
Dept: NEUROLOGY | Facility: CLINIC | Age: 42
End: 2017-07-31

## 2017-07-31 DIAGNOSIS — S06.9XAA TBI (TRAUMATIC BRAIN INJURY) (H): ICD-10-CM

## 2017-09-01 ENCOUNTER — COMMUNICATION - HEALTHEAST (OUTPATIENT)
Dept: NEUROLOGY | Facility: CLINIC | Age: 42
End: 2017-09-01

## 2017-09-01 DIAGNOSIS — S06.9XAA TBI (TRAUMATIC BRAIN INJURY) (H): ICD-10-CM

## 2017-10-05 ENCOUNTER — HOSPITAL ENCOUNTER (OUTPATIENT)
Dept: NEUROLOGY | Facility: CLINIC | Age: 42
Setting detail: THERAPIES SERIES
Discharge: STILL ADMITTED OR ADMINISTRATIVE | End: 2017-10-05
Attending: NURSE PRACTITIONER

## 2017-10-05 DIAGNOSIS — F41.1 ANXIETY STATE: ICD-10-CM

## 2017-10-05 DIAGNOSIS — S06.9XAA TBI (TRAUMATIC BRAIN INJURY) (H): ICD-10-CM

## 2017-10-05 DIAGNOSIS — F39 EPISODIC MOOD DISORDER (H): ICD-10-CM

## 2017-10-05 DIAGNOSIS — G47.00 INSOMNIA: ICD-10-CM

## 2017-10-09 ENCOUNTER — COMMUNICATION - HEALTHEAST (OUTPATIENT)
Dept: NEUROLOGY | Facility: CLINIC | Age: 42
End: 2017-10-09

## 2017-10-09 DIAGNOSIS — G47.00 INSOMNIA: ICD-10-CM

## 2017-10-31 ENCOUNTER — COMMUNICATION - HEALTHEAST (OUTPATIENT)
Dept: NEUROLOGY | Facility: CLINIC | Age: 42
End: 2017-10-31

## 2017-10-31 DIAGNOSIS — S06.9XAA TBI (TRAUMATIC BRAIN INJURY) (H): ICD-10-CM

## 2017-11-30 ENCOUNTER — COMMUNICATION - HEALTHEAST (OUTPATIENT)
Dept: NEUROLOGY | Facility: CLINIC | Age: 42
End: 2017-11-30

## 2017-11-30 DIAGNOSIS — S06.9XAA TBI (TRAUMATIC BRAIN INJURY) (H): ICD-10-CM

## 2018-01-02 ENCOUNTER — COMMUNICATION - HEALTHEAST (OUTPATIENT)
Dept: NEUROLOGY | Facility: CLINIC | Age: 43
End: 2018-01-02

## 2018-01-02 DIAGNOSIS — S06.9XAA TBI (TRAUMATIC BRAIN INJURY) (H): ICD-10-CM

## 2018-01-18 ENCOUNTER — HOSPITAL ENCOUNTER (OUTPATIENT)
Dept: NEUROLOGY | Facility: CLINIC | Age: 43
Setting detail: THERAPIES SERIES
Discharge: STILL ADMITTED OR ADMINISTRATIVE | End: 2018-01-18
Attending: NURSE PRACTITIONER

## 2018-01-18 DIAGNOSIS — R41.89 OTHER SYMPTOMS AND SIGNS INVOLVING COGNITIVE FUNCTIONS AND AWARENESS: ICD-10-CM

## 2018-01-29 ENCOUNTER — COMMUNICATION - HEALTHEAST (OUTPATIENT)
Dept: NEUROLOGY | Facility: CLINIC | Age: 43
End: 2018-01-29

## 2018-01-29 DIAGNOSIS — S06.9XAA TBI (TRAUMATIC BRAIN INJURY) (H): ICD-10-CM

## 2018-01-30 ENCOUNTER — COMMUNICATION - HEALTHEAST (OUTPATIENT)
Dept: NEUROLOGY | Facility: CLINIC | Age: 43
End: 2018-01-30

## 2018-01-30 DIAGNOSIS — G47.00 INSOMNIA: ICD-10-CM

## 2018-02-28 ENCOUNTER — COMMUNICATION - HEALTHEAST (OUTPATIENT)
Dept: NEUROLOGY | Facility: CLINIC | Age: 43
End: 2018-02-28

## 2018-02-28 DIAGNOSIS — S06.9XAA TBI (TRAUMATIC BRAIN INJURY) (H): ICD-10-CM

## 2018-03-27 ENCOUNTER — COMMUNICATION - HEALTHEAST (OUTPATIENT)
Dept: NEUROLOGY | Facility: CLINIC | Age: 43
End: 2018-03-27

## 2018-03-27 DIAGNOSIS — S06.9XAA TBI (TRAUMATIC BRAIN INJURY) (H): ICD-10-CM

## 2018-05-01 ENCOUNTER — COMMUNICATION - HEALTHEAST (OUTPATIENT)
Dept: NEUROLOGY | Facility: CLINIC | Age: 43
End: 2018-05-01

## 2018-05-01 DIAGNOSIS — S06.9XAA TBI (TRAUMATIC BRAIN INJURY) (H): ICD-10-CM

## 2018-05-27 ENCOUNTER — COMMUNICATION - HEALTHEAST (OUTPATIENT)
Dept: NEUROLOGY | Facility: CLINIC | Age: 43
End: 2018-05-27

## 2018-05-27 DIAGNOSIS — G47.00 INSOMNIA: ICD-10-CM

## 2018-05-29 ENCOUNTER — COMMUNICATION - HEALTHEAST (OUTPATIENT)
Dept: NEUROLOGY | Facility: CLINIC | Age: 43
End: 2018-05-29

## 2018-05-29 DIAGNOSIS — S06.9XAA TBI (TRAUMATIC BRAIN INJURY) (H): ICD-10-CM

## 2018-06-25 ENCOUNTER — COMMUNICATION - HEALTHEAST (OUTPATIENT)
Dept: NEUROLOGY | Facility: CLINIC | Age: 43
End: 2018-06-25

## 2018-06-25 DIAGNOSIS — S06.9XAA TBI (TRAUMATIC BRAIN INJURY) (H): ICD-10-CM

## 2018-07-19 ENCOUNTER — HOSPITAL ENCOUNTER (OUTPATIENT)
Dept: NEUROLOGY | Facility: CLINIC | Age: 43
Setting detail: THERAPIES SERIES
Discharge: STILL ADMITTED OR ADMINISTRATIVE | End: 2018-07-19
Attending: NURSE PRACTITIONER

## 2018-07-19 DIAGNOSIS — S06.9XAA TBI (TRAUMATIC BRAIN INJURY) (H): ICD-10-CM

## 2018-08-27 ENCOUNTER — COMMUNICATION - HEALTHEAST (OUTPATIENT)
Dept: NEUROLOGY | Facility: CLINIC | Age: 43
End: 2018-08-27

## 2018-08-27 DIAGNOSIS — S06.9XAA TBI (TRAUMATIC BRAIN INJURY) (H): ICD-10-CM

## 2018-09-21 ENCOUNTER — COMMUNICATION - HEALTHEAST (OUTPATIENT)
Dept: NEUROLOGY | Facility: CLINIC | Age: 43
End: 2018-09-21

## 2018-09-21 DIAGNOSIS — G47.00 INSOMNIA: ICD-10-CM

## 2018-09-21 DIAGNOSIS — S06.9XAA TBI (TRAUMATIC BRAIN INJURY) (H): ICD-10-CM

## 2018-09-22 ENCOUNTER — COMMUNICATION - HEALTHEAST (OUTPATIENT)
Dept: NEUROLOGY | Facility: CLINIC | Age: 43
End: 2018-09-22

## 2018-09-22 DIAGNOSIS — G47.00 INSOMNIA: ICD-10-CM

## 2018-09-22 DIAGNOSIS — S06.9XAA TBI (TRAUMATIC BRAIN INJURY) (H): ICD-10-CM

## 2018-10-19 ENCOUNTER — COMMUNICATION - HEALTHEAST (OUTPATIENT)
Dept: NEUROLOGY | Facility: CLINIC | Age: 43
End: 2018-10-19

## 2018-10-19 DIAGNOSIS — S06.9XAA TBI (TRAUMATIC BRAIN INJURY) (H): ICD-10-CM

## 2018-11-13 ENCOUNTER — COMMUNICATION - HEALTHEAST (OUTPATIENT)
Dept: NEUROLOGY | Facility: CLINIC | Age: 43
End: 2018-11-13

## 2018-11-13 DIAGNOSIS — S06.9XAA TBI (TRAUMATIC BRAIN INJURY) (H): ICD-10-CM

## 2018-11-19 ENCOUNTER — COMMUNICATION - HEALTHEAST (OUTPATIENT)
Dept: NEUROLOGY | Facility: CLINIC | Age: 43
End: 2018-11-19

## 2018-11-19 DIAGNOSIS — S06.9XAA TBI (TRAUMATIC BRAIN INJURY) (H): ICD-10-CM

## 2018-12-12 ENCOUNTER — COMMUNICATION - HEALTHEAST (OUTPATIENT)
Dept: NEUROLOGY | Facility: CLINIC | Age: 43
End: 2018-12-12

## 2018-12-12 DIAGNOSIS — S06.9XAA TBI (TRAUMATIC BRAIN INJURY) (H): ICD-10-CM

## 2018-12-17 ENCOUNTER — COMMUNICATION - HEALTHEAST (OUTPATIENT)
Dept: NEUROLOGY | Facility: CLINIC | Age: 43
End: 2018-12-17

## 2018-12-17 DIAGNOSIS — G47.00 INSOMNIA: ICD-10-CM

## 2019-01-15 ENCOUNTER — HOSPITAL ENCOUNTER (OUTPATIENT)
Dept: NEUROLOGY | Facility: CLINIC | Age: 44
Setting detail: THERAPIES SERIES
Discharge: STILL ADMITTED OR ADMINISTRATIVE | End: 2019-01-15
Attending: NURSE PRACTITIONER

## 2019-01-15 DIAGNOSIS — F07.81 POST CONCUSSION SYNDROME: ICD-10-CM

## 2019-01-15 DIAGNOSIS — S06.9XAA TBI (TRAUMATIC BRAIN INJURY) (H): ICD-10-CM

## 2019-01-23 ENCOUNTER — COMMUNICATION - HEALTHEAST (OUTPATIENT)
Dept: NEUROLOGY | Facility: CLINIC | Age: 44
End: 2019-01-23

## 2019-01-23 DIAGNOSIS — F06.30 MOOD DISORDER IN CONDITIONS CLASSIFIED ELSEWHERE: ICD-10-CM

## 2019-01-24 ENCOUNTER — COMMUNICATION - HEALTHEAST (OUTPATIENT)
Dept: NEUROLOGY | Facility: CLINIC | Age: 44
End: 2019-01-24

## 2019-01-24 DIAGNOSIS — F06.30 MOOD DISORDER IN CONDITIONS CLASSIFIED ELSEWHERE: ICD-10-CM

## 2019-02-18 ENCOUNTER — COMMUNICATION - HEALTHEAST (OUTPATIENT)
Dept: NEUROLOGY | Facility: CLINIC | Age: 44
End: 2019-02-18

## 2019-02-18 DIAGNOSIS — S06.9XAA TBI (TRAUMATIC BRAIN INJURY) (H): ICD-10-CM

## 2019-03-14 ENCOUNTER — COMMUNICATION - HEALTHEAST (OUTPATIENT)
Dept: NEUROLOGY | Facility: CLINIC | Age: 44
End: 2019-03-14

## 2019-03-14 DIAGNOSIS — S06.9XAA TBI (TRAUMATIC BRAIN INJURY) (H): ICD-10-CM

## 2019-03-15 ENCOUNTER — COMMUNICATION - HEALTHEAST (OUTPATIENT)
Dept: NEUROLOGY | Facility: CLINIC | Age: 44
End: 2019-03-15

## 2019-04-11 ENCOUNTER — COMMUNICATION - HEALTHEAST (OUTPATIENT)
Dept: NEUROLOGY | Facility: CLINIC | Age: 44
End: 2019-04-11

## 2019-04-11 DIAGNOSIS — G47.00 INSOMNIA: ICD-10-CM

## 2019-04-16 ENCOUNTER — COMMUNICATION - HEALTHEAST (OUTPATIENT)
Dept: NEUROLOGY | Facility: CLINIC | Age: 44
End: 2019-04-16

## 2019-04-16 DIAGNOSIS — S06.9XAA TBI (TRAUMATIC BRAIN INJURY) (H): ICD-10-CM

## 2019-05-02 ENCOUNTER — HOSPITAL ENCOUNTER (OUTPATIENT)
Dept: NEUROLOGY | Facility: CLINIC | Age: 44
Setting detail: THERAPIES SERIES
Discharge: STILL ADMITTED OR ADMINISTRATIVE | End: 2019-05-02
Attending: PSYCHIATRY & NEUROLOGY

## 2019-05-02 DIAGNOSIS — F06.30 MOOD DISORDER IN CONDITIONS CLASSIFIED ELSEWHERE: ICD-10-CM

## 2019-05-16 ENCOUNTER — COMMUNICATION - HEALTHEAST (OUTPATIENT)
Dept: NEUROLOGY | Facility: CLINIC | Age: 44
End: 2019-05-16

## 2019-05-16 DIAGNOSIS — S06.9XAA TBI (TRAUMATIC BRAIN INJURY) (H): ICD-10-CM

## 2019-06-17 ENCOUNTER — COMMUNICATION - HEALTHEAST (OUTPATIENT)
Dept: NEUROLOGY | Facility: CLINIC | Age: 44
End: 2019-06-17

## 2019-06-17 DIAGNOSIS — S06.9XAA TBI (TRAUMATIC BRAIN INJURY) (H): ICD-10-CM

## 2019-06-28 ENCOUNTER — COMMUNICATION - HEALTHEAST (OUTPATIENT)
Dept: NEUROLOGY | Facility: CLINIC | Age: 44
End: 2019-06-28

## 2019-06-28 DIAGNOSIS — G47.00 INSOMNIA: ICD-10-CM

## 2019-07-10 ENCOUNTER — COMMUNICATION - HEALTHEAST (OUTPATIENT)
Dept: NEUROLOGY | Facility: CLINIC | Age: 44
End: 2019-07-10

## 2019-07-10 DIAGNOSIS — S06.9XAA TBI (TRAUMATIC BRAIN INJURY) (H): ICD-10-CM

## 2019-08-12 ENCOUNTER — COMMUNICATION - HEALTHEAST (OUTPATIENT)
Dept: NEUROLOGY | Facility: CLINIC | Age: 44
End: 2019-08-12

## 2019-08-12 DIAGNOSIS — S06.9XAA TBI (TRAUMATIC BRAIN INJURY) (H): ICD-10-CM

## 2019-09-05 ENCOUNTER — HOSPITAL ENCOUNTER (OUTPATIENT)
Dept: NEUROLOGY | Facility: CLINIC | Age: 44
Setting detail: THERAPIES SERIES
Discharge: STILL ADMITTED OR ADMINISTRATIVE | End: 2019-09-05
Attending: PSYCHIATRY & NEUROLOGY

## 2019-09-05 DIAGNOSIS — G47.00 INSOMNIA: ICD-10-CM

## 2019-09-05 DIAGNOSIS — F06.30 MOOD DISORDER IN CONDITIONS CLASSIFIED ELSEWHERE: ICD-10-CM

## 2019-09-10 ENCOUNTER — COMMUNICATION - HEALTHEAST (OUTPATIENT)
Dept: NEUROLOGY | Facility: CLINIC | Age: 44
End: 2019-09-10

## 2019-09-10 DIAGNOSIS — S06.9XAA TBI (TRAUMATIC BRAIN INJURY) (H): ICD-10-CM

## 2019-10-14 ENCOUNTER — COMMUNICATION - HEALTHEAST (OUTPATIENT)
Dept: NEUROLOGY | Facility: CLINIC | Age: 44
End: 2019-10-14

## 2019-10-14 DIAGNOSIS — S06.9XAA TBI (TRAUMATIC BRAIN INJURY) (H): ICD-10-CM

## 2019-11-11 ENCOUNTER — COMMUNICATION - HEALTHEAST (OUTPATIENT)
Dept: NEUROLOGY | Facility: CLINIC | Age: 44
End: 2019-11-11

## 2019-11-11 DIAGNOSIS — S06.9XAA TBI (TRAUMATIC BRAIN INJURY) (H): ICD-10-CM

## 2019-12-05 ENCOUNTER — HOSPITAL ENCOUNTER (OUTPATIENT)
Dept: NEUROLOGY | Facility: CLINIC | Age: 44
Setting detail: THERAPIES SERIES
Discharge: STILL ADMITTED OR ADMINISTRATIVE | End: 2019-12-05
Attending: PSYCHIATRY & NEUROLOGY

## 2019-12-05 DIAGNOSIS — S06.9XAA TBI (TRAUMATIC BRAIN INJURY) (H): ICD-10-CM

## 2020-01-01 ENCOUNTER — COMMUNICATION - HEALTHEAST (OUTPATIENT)
Dept: NEUROLOGY | Facility: CLINIC | Age: 45
End: 2020-01-01

## 2020-01-01 DIAGNOSIS — G47.00 INSOMNIA: ICD-10-CM

## 2020-01-17 ENCOUNTER — COMMUNICATION - HEALTHEAST (OUTPATIENT)
Dept: NEUROLOGY | Facility: CLINIC | Age: 45
End: 2020-01-17

## 2020-01-17 DIAGNOSIS — S06.9XAA TBI (TRAUMATIC BRAIN INJURY) (H): ICD-10-CM

## 2020-02-12 ENCOUNTER — COMMUNICATION - HEALTHEAST (OUTPATIENT)
Dept: NEUROLOGY | Facility: CLINIC | Age: 45
End: 2020-02-12

## 2020-02-12 DIAGNOSIS — S06.9XAA TBI (TRAUMATIC BRAIN INJURY) (H): ICD-10-CM

## 2020-03-10 ENCOUNTER — COMMUNICATION - HEALTHEAST (OUTPATIENT)
Dept: NEUROLOGY | Facility: CLINIC | Age: 45
End: 2020-03-10

## 2020-03-10 DIAGNOSIS — S06.9XAA TBI (TRAUMATIC BRAIN INJURY) (H): ICD-10-CM

## 2020-04-02 ENCOUNTER — COMMUNICATION - HEALTHEAST (OUTPATIENT)
Dept: NEUROLOGY | Facility: CLINIC | Age: 45
End: 2020-04-02

## 2020-04-02 DIAGNOSIS — G47.00 INSOMNIA: ICD-10-CM

## 2020-04-14 ENCOUNTER — COMMUNICATION - HEALTHEAST (OUTPATIENT)
Dept: NEUROLOGY | Facility: CLINIC | Age: 45
End: 2020-04-14

## 2020-04-14 DIAGNOSIS — S06.9XAA TBI (TRAUMATIC BRAIN INJURY) (H): ICD-10-CM

## 2020-04-23 ENCOUNTER — COMMUNICATION - HEALTHEAST (OUTPATIENT)
Dept: NEUROLOGY | Facility: CLINIC | Age: 45
End: 2020-04-23

## 2020-04-24 ENCOUNTER — HOSPITAL ENCOUNTER (OUTPATIENT)
Dept: NEUROLOGY | Facility: CLINIC | Age: 45
Setting detail: THERAPIES SERIES
Discharge: STILL ADMITTED OR ADMINISTRATIVE | End: 2020-04-24
Attending: NURSE PRACTITIONER

## 2020-04-24 DIAGNOSIS — F06.30 MOOD DISORDER AS LATE EFFECT OF TRAUMATIC BRAIN INJURY (H): ICD-10-CM

## 2020-04-24 DIAGNOSIS — R53.83 FATIGUE, UNSPECIFIED TYPE: ICD-10-CM

## 2020-04-24 DIAGNOSIS — F41.1 ANXIETY STATE: ICD-10-CM

## 2020-04-24 DIAGNOSIS — Z79.899 MEDICATION MANAGEMENT: ICD-10-CM

## 2020-04-24 DIAGNOSIS — S06.9X9S TRAUMATIC BRAIN INJURY WITH LOSS OF CONSCIOUSNESS, SEQUELA (H): ICD-10-CM

## 2020-04-24 DIAGNOSIS — F07.81 POST CONCUSSION SYNDROME: ICD-10-CM

## 2020-04-24 DIAGNOSIS — R68.89 LIGHT SENSITIVITY: ICD-10-CM

## 2020-04-24 DIAGNOSIS — F98.8 ATTENTION DEFICIT DISORDER, UNSPECIFIED HYPERACTIVITY PRESENCE: ICD-10-CM

## 2020-04-24 DIAGNOSIS — F09 COGNITIVE DISORDER: ICD-10-CM

## 2020-04-24 DIAGNOSIS — S06.9XAS MOOD DISORDER AS LATE EFFECT OF TRAUMATIC BRAIN INJURY (H): ICD-10-CM

## 2020-04-24 DIAGNOSIS — S06.9XAA TBI (TRAUMATIC BRAIN INJURY) (H): ICD-10-CM

## 2020-04-24 DIAGNOSIS — G47.00 INSOMNIA, UNSPECIFIED TYPE: ICD-10-CM

## 2020-04-27 ENCOUNTER — HOSPITAL ENCOUNTER (OUTPATIENT)
Dept: NEUROLOGY | Facility: CLINIC | Age: 45
Setting detail: THERAPIES SERIES
Discharge: STILL ADMITTED OR ADMINISTRATIVE | End: 2020-04-27
Attending: NURSE PRACTITIONER

## 2020-04-27 DIAGNOSIS — R11.0 NAUSEA: ICD-10-CM

## 2020-04-27 DIAGNOSIS — R53.83 FATIGUE, UNSPECIFIED TYPE: ICD-10-CM

## 2020-04-27 DIAGNOSIS — F09 COGNITIVE DISORDER: ICD-10-CM

## 2020-04-27 DIAGNOSIS — F07.81 POST CONCUSSION SYNDROME: ICD-10-CM

## 2020-04-27 DIAGNOSIS — Z02.6 ENCOUNTER RELATED TO WORKER'S COMPENSATION CLAIM: ICD-10-CM

## 2020-04-27 DIAGNOSIS — F90.9 ATTENTION DEFICIT HYPERACTIVITY DISORDER (ADHD), UNSPECIFIED ADHD TYPE: ICD-10-CM

## 2020-04-27 DIAGNOSIS — R68.89 SENSITIVITY TO LIGHT: ICD-10-CM

## 2020-04-27 DIAGNOSIS — H83.3X3 SOUND SENSITIVITY IN BOTH EARS: ICD-10-CM

## 2020-04-27 DIAGNOSIS — R41.840 ATTENTION AND CONCENTRATION DEFICIT: ICD-10-CM

## 2020-04-27 DIAGNOSIS — R45.4 IRRITABILITY: ICD-10-CM

## 2020-04-27 DIAGNOSIS — F06.4 ANXIETY DISORDER DUE TO MEDICAL CONDITION: ICD-10-CM

## 2020-04-27 DIAGNOSIS — G47.00 INSOMNIA, UNSPECIFIED TYPE: ICD-10-CM

## 2020-04-27 DIAGNOSIS — R41.3 MEMORY DIFFICULTIES: ICD-10-CM

## 2020-04-27 DIAGNOSIS — Z76.89 RETURN TO WORK EVALUATION: ICD-10-CM

## 2020-04-27 DIAGNOSIS — R42 DIZZINESS: ICD-10-CM

## 2020-04-27 DIAGNOSIS — R41.9 NEUROCOGNITIVE DISORDER: ICD-10-CM

## 2020-05-01 ENCOUNTER — COMMUNICATION - HEALTHEAST (OUTPATIENT)
Dept: NEUROLOGY | Facility: CLINIC | Age: 45
End: 2020-05-01

## 2020-05-01 ENCOUNTER — HOSPITAL ENCOUNTER (OUTPATIENT)
Dept: NEUROLOGY | Facility: CLINIC | Age: 45
Setting detail: THERAPIES SERIES
Discharge: STILL ADMITTED OR ADMINISTRATIVE | End: 2020-05-01
Attending: NURSE PRACTITIONER

## 2020-05-01 DIAGNOSIS — F90.9 ATTENTION DEFICIT HYPERACTIVITY DISORDER (ADHD), UNSPECIFIED ADHD TYPE: ICD-10-CM

## 2020-05-01 DIAGNOSIS — F07.81 POST CONCUSSION SYNDROME: ICD-10-CM

## 2020-05-01 DIAGNOSIS — R11.0 NAUSEA: ICD-10-CM

## 2020-05-01 DIAGNOSIS — R68.89 SENSITIVITY TO LIGHT: ICD-10-CM

## 2020-05-01 DIAGNOSIS — G47.00 INSOMNIA, UNSPECIFIED TYPE: ICD-10-CM

## 2020-05-01 DIAGNOSIS — R41.840 ATTENTION AND CONCENTRATION DEFICIT: ICD-10-CM

## 2020-05-01 DIAGNOSIS — R41.3 MEMORY DIFFICULTIES: ICD-10-CM

## 2020-05-01 DIAGNOSIS — R53.83 FATIGUE, UNSPECIFIED TYPE: ICD-10-CM

## 2020-05-01 DIAGNOSIS — R42 DIZZINESS: ICD-10-CM

## 2020-05-01 DIAGNOSIS — G44.309 POST-CONCUSSION HEADACHE: ICD-10-CM

## 2020-05-01 DIAGNOSIS — F06.4 ANXIETY DISORDER DUE TO MEDICAL CONDITION: ICD-10-CM

## 2020-05-01 DIAGNOSIS — H83.3X3 SOUND SENSITIVITY IN BOTH EARS: ICD-10-CM

## 2020-05-01 DIAGNOSIS — R45.4 IRRITABILITY: ICD-10-CM

## 2020-05-08 ENCOUNTER — COMMUNICATION - HEALTHEAST (OUTPATIENT)
Dept: NEUROLOGY | Facility: CLINIC | Age: 45
End: 2020-05-08

## 2020-05-08 ENCOUNTER — HOSPITAL ENCOUNTER (OUTPATIENT)
Dept: NEUROLOGY | Facility: CLINIC | Age: 45
Setting detail: THERAPIES SERIES
Discharge: STILL ADMITTED OR ADMINISTRATIVE | End: 2020-05-08
Attending: NURSE PRACTITIONER

## 2020-05-08 DIAGNOSIS — R68.89 SENSITIVITY TO LIGHT: ICD-10-CM

## 2020-05-08 DIAGNOSIS — F90.9 ATTENTION DEFICIT HYPERACTIVITY DISORDER (ADHD), UNSPECIFIED ADHD TYPE: ICD-10-CM

## 2020-05-08 DIAGNOSIS — Z76.89 RETURN TO WORK EVALUATION: ICD-10-CM

## 2020-05-08 DIAGNOSIS — R53.83 FATIGUE, UNSPECIFIED TYPE: ICD-10-CM

## 2020-05-08 DIAGNOSIS — F06.4 ANXIETY DISORDER DUE TO MEDICAL CONDITION: ICD-10-CM

## 2020-05-08 DIAGNOSIS — R11.0 NAUSEA: ICD-10-CM

## 2020-05-08 DIAGNOSIS — F07.81 POST CONCUSSION SYNDROME: ICD-10-CM

## 2020-05-08 DIAGNOSIS — H83.3X3 SOUND SENSITIVITY IN BOTH EARS: ICD-10-CM

## 2020-05-08 DIAGNOSIS — G44.309 POST-CONCUSSION HEADACHE: ICD-10-CM

## 2020-05-08 DIAGNOSIS — Z87.820 HISTORY OF MULTIPLE CONCUSSIONS: ICD-10-CM

## 2020-05-08 DIAGNOSIS — R41.840 ATTENTION AND CONCENTRATION DEFICIT: ICD-10-CM

## 2020-05-08 DIAGNOSIS — R41.3 MEMORY DIFFICULTIES: ICD-10-CM

## 2020-05-08 DIAGNOSIS — R45.4 IRRITABILITY: ICD-10-CM

## 2020-05-08 DIAGNOSIS — Z02.6 ENCOUNTER RELATED TO WORKER'S COMPENSATION CLAIM: ICD-10-CM

## 2020-05-08 DIAGNOSIS — R42 DIZZINESS: ICD-10-CM

## 2020-05-08 DIAGNOSIS — G47.00 INSOMNIA, UNSPECIFIED TYPE: ICD-10-CM

## 2020-05-26 ENCOUNTER — HOSPITAL ENCOUNTER (OUTPATIENT)
Dept: NEUROLOGY | Facility: CLINIC | Age: 45
Setting detail: THERAPIES SERIES
Discharge: STILL ADMITTED OR ADMINISTRATIVE | End: 2020-05-26
Attending: NURSE PRACTITIONER

## 2020-05-26 DIAGNOSIS — Z76.89 RETURN TO WORK EVALUATION: ICD-10-CM

## 2020-05-26 DIAGNOSIS — H83.3X3 SOUND SENSITIVITY IN BOTH EARS: ICD-10-CM

## 2020-05-26 DIAGNOSIS — R11.0 NAUSEA: ICD-10-CM

## 2020-05-26 DIAGNOSIS — R45.4 IRRITABILITY: ICD-10-CM

## 2020-05-26 DIAGNOSIS — R68.89 SENSITIVITY TO LIGHT: ICD-10-CM

## 2020-05-26 DIAGNOSIS — F06.4 ANXIETY DISORDER DUE TO MEDICAL CONDITION: ICD-10-CM

## 2020-05-26 DIAGNOSIS — R41.840 ATTENTION AND CONCENTRATION DEFICIT: ICD-10-CM

## 2020-05-26 DIAGNOSIS — R42 DIZZINESS: ICD-10-CM

## 2020-05-26 DIAGNOSIS — Z02.6 ENCOUNTER RELATED TO WORKER'S COMPENSATION CLAIM: ICD-10-CM

## 2020-05-26 DIAGNOSIS — R41.3 MEMORY DIFFICULTIES: ICD-10-CM

## 2020-05-26 DIAGNOSIS — G47.00 INSOMNIA, UNSPECIFIED TYPE: ICD-10-CM

## 2020-05-26 DIAGNOSIS — F07.81 POST CONCUSSION SYNDROME: ICD-10-CM

## 2020-05-26 DIAGNOSIS — R53.83 FATIGUE, UNSPECIFIED TYPE: ICD-10-CM

## 2020-05-26 DIAGNOSIS — G44.309 POST-CONCUSSION HEADACHE: ICD-10-CM

## 2020-06-01 ENCOUNTER — RECORDS - HEALTHEAST (OUTPATIENT)
Dept: ADMINISTRATIVE | Facility: OTHER | Age: 45
End: 2020-06-01

## 2020-06-02 ENCOUNTER — COMMUNICATION - HEALTHEAST (OUTPATIENT)
Dept: NEUROLOGY | Facility: CLINIC | Age: 45
End: 2020-06-02

## 2020-06-02 DIAGNOSIS — F90.9 ATTENTION DEFICIT HYPERACTIVITY DISORDER (ADHD), UNSPECIFIED ADHD TYPE: ICD-10-CM

## 2020-06-26 ENCOUNTER — COMMUNICATION - HEALTHEAST (OUTPATIENT)
Dept: NEUROLOGY | Facility: CLINIC | Age: 45
End: 2020-06-26

## 2020-06-26 DIAGNOSIS — G47.00 INSOMNIA: ICD-10-CM

## 2020-08-20 ENCOUNTER — MEDICAL CORRESPONDENCE (OUTPATIENT)
Dept: HEALTH INFORMATION MANAGEMENT | Facility: CLINIC | Age: 45
End: 2020-08-20

## 2020-09-18 ENCOUNTER — TRANSCRIBE ORDERS (OUTPATIENT)
Dept: OTHER | Age: 45
End: 2020-09-18

## 2020-09-18 DIAGNOSIS — G44.309 POSTTRAUMATIC HEADACHE: Primary | ICD-10-CM

## 2020-09-21 ENCOUNTER — TELEPHONE (OUTPATIENT)
Dept: OPHTHALMOLOGY | Facility: CLINIC | Age: 45
End: 2020-09-21
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
## 2020-11-16 ENCOUNTER — HEALTH MAINTENANCE LETTER (OUTPATIENT)
Age: 45
End: 2020-11-16

## 2020-12-18 ENCOUNTER — COMMUNICATION - HEALTHEAST (OUTPATIENT)
Dept: NEUROLOGY | Facility: CLINIC | Age: 45
End: 2020-12-18

## 2020-12-18 DIAGNOSIS — G47.00 INSOMNIA: ICD-10-CM

## 2021-05-18 ENCOUNTER — COMMUNICATION - HEALTHEAST (OUTPATIENT)
Dept: NEUROLOGY | Facility: CLINIC | Age: 46
End: 2021-05-18

## 2021-05-18 DIAGNOSIS — G47.00 INSOMNIA: ICD-10-CM
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
## 2021-05-28 NOTE — PROGRESS NOTES
Patient's impression of how medication is working? Yes     Compliant with Medication? Yes    Side Effects: Other - Thinks the Effexor is causing her to yawn more. Trouble with reading.     Current Symptoms : No    Pain (0-10) No  Appetite change No  Sleep disturbance No  Change in energy a little less  Change in interest No  Change in concentration No  Psychosis/Hallucinations No  Negative thoughts No  Mood swings No  Alcohol use No  Drug use No  Anxiety moderate (less social and feels like she is having more fears in the last few weeks)  Sad/depressed mood low

## 2021-05-30 VITALS — WEIGHT: 106 LBS | BODY MASS INDEX: 18.78 KG/M2

## 2021-05-31 VITALS — BODY MASS INDEX: 18.95 KG/M2 | WEIGHT: 107 LBS

## 2021-06-01 NOTE — PROGRESS NOTES
Patient's impression of how medication is working? Trying to get migraines under control. Many sleepless nights so patient takes an extra dose of
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
## 2021-06-04 NOTE — PROGRESS NOTES
Patient's impression of how medication is working? Still having issues with migraines and vision.  Since going back to work, she has been very symptomatic   Compliant with Medication? Yes  Side Effects: None    Pain (0-10) No  Appetite change No  Sleep disturbance No  Change in energy No  Change in interest No  Change in concentration Yes  Psychosis/Hallucinations No  Negative thoughts No  Mood swings No  Alcohol use No  Drug use No  Anxiety mild  Sad/depressed mood minimal
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
## 2021-06-07 NOTE — TELEPHONE ENCOUNTER
Prior Authorization Request  Who s requesting:  Pharmacy  Pharmacy Name and Location: [facility] [place] [facility], [phone]  Medication Name: METHYLPHENIDATE 5MG TABLETS  Insurance Plan: [employer]  Insurance Member ID Number:  [id number]  CoverMyMeds Key: N/A  Informed patient that prior authorizations can take up to 10 business days for response:   Yes  Okay to leave a detailed message: Yes
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
## 2021-06-07 NOTE — TELEPHONE ENCOUNTER
Central PA team  [phone]  Pool: [facility] PA MED ([id number])          PA has been initiated.       PA form completed and faxed insurance via Cover My Meds     Key:  [id number]     Medication:  METHYLPHENIDATE 5MG    Insurance:  [employer]         Response will be received via fax and may take up to 5-10 business days depending on plan
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
## 2021-06-08 NOTE — TELEPHONE ENCOUNTER
Quantity of prescription was incorrect last time it was filled.  Pt is running out of medication because it was only sent in for 60 tablets when it should've been 90.
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
## 2021-06-09 NOTE — PROGRESS NOTES
Speech Language/Pathology   Discharge Summary    Carlie Montejo  1975      [id number]   Referring Provider: Zayda Jo CNP  Date of Onset 12/25/2014  Start of Care 09/27/2016    Date of Last Visit 12/29/2016    The patient was seen for a total of 11 visits since the start of care.  Medical Diagnosis mTBI  Treatment Diagnosis cognition    Summary of Goals and Functional Progress  Long term goals: Return to full-time competitive employment. NOT MET  hort term goals:  1. Pt will verbalize x4 new word-finding strategies. NOT MET  2. 3. Pt will functionally utilize external aids for memory in 4/5 opportunities per txpist audit. MET  3. Pt will complete mod - high level visuospatial tasks with 80% accuracy. NOT ADDRESSED  4. Pt will set appropriate LTG's and demonstrate the ability to break down into smaller action steps x5. NOT MET    Pt did not return for prescribed tx sessions.     Plan  Discharge tx  Patient did not return     Physician Recommendation:  1. I certify the need for these services furnished within this plan and while under my care. I agree with the therapist's recommendation for plan of care.  2. If there is any recommendation for modification of therapy plan, please indicate below.

## 2021-06-09 NOTE — PROGRESS NOTES
How have you been doing since we last saw you? any concerns?( new pt. Ask how concussion happen?) f/u appt, ongoing vertigo and sleeping problem.       Current Symptoms : No

## 2021-06-09 NOTE — PROGRESS NOTES
.  Assessment / Impression     Postconcussion symptoms secondary to a recent fall, resolving symptoms of headache and vertigo.  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  Mood disorder; NOS.  Anxiety; NOS.  Insomnia   Follow-up medication evaluation.  Patient's impression of how medication is working? Stopped lexapro, no effect.  remeron not covered by insurance  Compliant with medication? yes    Side effects:None       Plan:      trazodone 50 mg, half to one full tablet at bedtime.  I did warn her that it can cause some drop in blood pressure and lightheadedness.  If she has any trouble with this medication, she can call our office.  We discussed Remeron in the future, her insurance changed what might be covered.  Will have patient follow up in 3 months.    Subjective:      HPI: Carlie Montejo is a 41 y.o. female with   multiple concussions who recently had a another concussion secondary to a fall.  CT of the head was negative.  She had some headaches, vertical increase cognitive issues and nausea.  Those symptoms have resolved and she states she completed speech therapy and PT.  She is also been having some sleep issues, we tried Remeron but she states it was not covered.  We tried Lexapro and patient states she was on it for 6 weeks and noticed no change so she just stopped it.  She is no longer seeing our psychologist here, she is looking for a therapist that works with brain injury, marital issues and eating disorder.  She does have 2 consults coming out.  She still has the vertical but that is improving.  I did look up on the chart, she did cancel her last 3 appointments for psychology, PT and speech so she was discharged.  No new falls.    Carlie  has a past medical history of Anemia; Chronic pain; Eating disorder; H/O multiple concussions; IBS (irritable bowel syndrome); Insomnia; Multiple fractures; and PTSD (post-traumatic stress disorder).  Carlie  has a past surgical history that includes  left hip surgery.  Erythromycin  Current Outpatient Prescriptions   Medication Sig Dispense Refill     cholecalciferol, vitamin D3, 400 unit/mL Drop drops Take 400 Units by mouth daily.       ibuprofen (ADVIL,MOTRIN) 200 MG tablet Take 200 mg by mouth every 8 (eight) hours as needed for pain.       Lactobacillus rhamnosus GG (CULTURELLE) 10-15 Billion cell capsule Take 1 capsule by mouth daily.       lisdexamfetamine (VYVANSE) 30 MG capsule Take 1 capsule (30 mg total) by mouth every morning. 30 capsule 0     multivitamin therapeutic (THERAGRAN) tablet Take 1 tablet by mouth daily.       OMEGA-3/DHA/EPA/FISH OIL (FISH OIL-OMEGA-3 FATTY ACIDS) 300-1,000 mg capsule Take 2 g by mouth daily.       traZODone (DESYREL) 50 MG tablet Take 1 tablet (50 mg total) by mouth bedtime. 30 tablet 0     No current facility-administered medications for this encounter.      No family history on file.  Social History     Social History     Marital status:      Spouse name: N/A     Number of children: N/A     Years of education: N/A     Social History Main Topics     Smoking status: Not on file     Smokeless tobacco: Not on file     Alcohol use Not on file     Drug use: Not on file     Sexual activity: Not on file     Other Topics Concern     Not on file     Social History Narrative     No narrative on file       The following portions of the patient's history were reviewed and updated as appropriate: allergies, current medications, past family history, past medical history, past social history, past surgical history and problem list.    Review of Systems  Constitutional: negative  Neurological: positive for headaches and vertigo  Behavioral/Psych: positive for anxiety, depression, fatigue and sleep disturbance       Objective:   @VS    Mental Status Exam:     Appearance:  patient is well groomed, pleasant and cooperative.  Good eye contact.    Speech / Language : Within normal    Associations: appropriate    Alert and oriented X  3:yes    Mood: Euthymic  Affect: Congruent w/content of speech    Suicidal / Homicidal ideation:none  If yes, document safety plan:    Fund of knowledge: good    Thought process:Within normal    Judgement / insight: No Evidence of Impairment    Recent and remote memory: WNL    Attention: Within normal  Concentration: Within normal    Motor status: Gait normal, no involuntary movements.    Scores: NA    Change in medication? Yes    Education    Reviewed risks/benefits of medication      Physical Exam: /57 (Patient Position: Sitting)  Pulse 72  Wt 106 lb (48.1 kg)  BMI 18.78 kg/m2  General appearance: alert, appears stated age, cooperative and no distress  Neurologic: Mental status: Alert, oriented, thought content appropriate, affect: mood-congruent, thought content exhibits logical connections.Thought content devoid of any delusions, suicidal, homicidal or obsesional content.

## 2021-06-10 NOTE — PROGRESS NOTES
Psychology Discharge Note      Patient has not followed up with this writer since most recent appointment on 12/29/16. Several outreach attempts were made regarding her desire to reschedule with no follow-through from patient. According to the medical chart, patient is attempting to find a new therapist who specializes in brain injury, relationship concerns, and eating disorders. She is considered discharged from this writer's care at this time. Patient may resume care in the future with this writer should she so need or desire.      Provider Name: Keturah Zamarripa PsyD,   Date: 5/2/2017

## 2021-06-11 NOTE — PROGRESS NOTES
How have you been doing since we last saw you? any concerns?( new pt. Ask how concussion happen?) f/u appt, symptoms has improve will like a hand written prescription for Vyvanse.        Current Symptoms : Yes

## 2021-06-11 NOTE — PROGRESS NOTES
.  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  Mood disorder; NOS, stable.  Anxiety disorder; NOS, stable.  Insomnia; improved.  Follow-up medication evaluation.  Trazodone added at last visit.  Patient's impression of how medication is working? good  Compliant with medication? yes    Side effects:None       Plan:     I did give patient a printed prescription for Vyvanse as her current pharmacy is not excepting S[facility].  Patient will call next month with her new pharmacy as she is moving to [facility].  We will have patient follow up in 3 months.    Subjective:      HPI: Carlie Montejo is a 42 y.o. female with cognitive and mood disorder secondary to multiple head injuries.  She also suffers from insomnia and eating disorder.  Patient is here for follow-up medication evaluation.  At last visit, we had her try trazodone for sleep.  Carlie has found that is worked very well.  She does not need to take it every night, when she does she uses 100 mg.  That helps her sleep, no side effects.  She still has occasional nightmares but they are usually 1 nightmare every 1-2 weeks.  She is getting about 6-7 hours restful sleep.  She feels her cognitive status and mood has improved.  No further postconcussion symptoms from her last fall.  She is now working, enjoys the job and planning to move closer to the [facility].  She did have a therapist, but with the move she is in a search for someone closer.  Patient does report she had a kidney infection recently and had to be hospitalized but that has resolved.    Carlie  has a past medical history of Anemia; Chronic pain; Eating disorder; H/O multiple concussions; IBS (irritable bowel syndrome); Insomnia; Multiple fractures; and PTSD (post-traumatic stress disorder).  Carlie  has a past surgical history that includes left hip surgery.  Erythromycin  Current Outpatient Prescriptions   Medication Sig Dispense Refill     Lactobacillus  rhamnosus GG (CULTURELLE) 10-15 Billion cell capsule Take 1 capsule by mouth daily.       lisdexamfetamine (VYVANSE) 30 MG capsule Take 1 capsule (30 mg total) by mouth every morning. 30 capsule 0     multivitamin therapeutic (THERAGRAN) tablet Take 1 tablet by mouth daily.       OMEGA-3/DHA/EPA/FISH OIL (FISH OIL-OMEGA-3 FATTY ACIDS) 300-1,000 mg capsule Take 2 g by mouth daily.       traZODone (DESYREL) 50 MG tablet TAKE 1 TABLET (50 MG TOTAL) BY MOUTH BEDTIME. (Patient taking differently: 100mg) 30 tablet 2     No current facility-administered medications for this encounter.      No family history on file.  Social History     Social History     Marital status:      Spouse name: N/A     Number of children: N/A     Years of education: N/A     Social History Main Topics     Smoking status: Not on file     Smokeless tobacco: Not on file     Alcohol use Not on file     Drug use: Not on file     Sexual activity: Not on file     Other Topics Concern     Not on file     Social History Narrative     No narrative on file       The following portions of the patient's history were reviewed and updated as appropriate: allergies, current medications, past family history, past medical history, past social history, past surgical history and problem list.    Review of Systems  Constitutional: negative  Musculoskeletal:negative for back pain and neck pain  Neurological: negative for headaches  Behavioral/Psych: negative for anxiety, depression, fatigue, loss of interest in favorite activities, mood swings and sleep disturbance       Objective:   @VS    Mental Status Exam:     Appearance: Patient is well-groomed, pleasant and cooperative.  Good eye contact.  No psychomotor agitation.    Speech / Language : Within normal    Associations: appropriate    Alert and oriented X 3:yes    Mood: Euthymic  Affect: Congruent w/content of speech    Suicidal / Homicidal ideation:none  If yes, document safety plan:    Fund of knowledge:  good    Thought process:Within normal    Judgement / insight: No Evidence of Impairment    Recent and remote memory: WNL    Attention: Within normal  Concentration: Within normal    Motor status: Gait normal, no involuntary movements.    Scores: NA    Change in medication? No    Education    Reviewed risks/benefits of medication      Physical Exam: General appearance: alert, appears stated age, cooperative and no distress  Neurologic: Mental status: Alert, oriented, thought content appropriate, affect: mood-congruent, thought content exhibits logical connections.Thought content devoid of any delusions, suicidal, homicidal or obsesional content.

## 2021-06-13 NOTE — PROGRESS NOTES
.  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  Mood disorder; NOS, stable.  Anxiety disorder; NOS, stable.  Insomnia; stable.  Follow-up medication evaluation.  Patient's impression of how medication is working? Yes but feels she needs an increase in the vyvnase  Compliant with medication? yes    Side effects:None       Plan:     We will increase the Vyvanse to 40 mg daily.  Patient to watch to make sure her anxiety and insomnia do not worsen.  We will have patient follow-up in 3 months.    Subjective:      HPI: Carlie Montejo is a 42 y.o. female with multiple concussions, mood disorder and anxiety.  She is here for follow-up medication evaluation.  Patient is glad to be back working.  She feels she is adjusting and handling the stress.  Using the skills she learned in therapy.  If she does get overwhelmed she may have some mild headache or vertigo but realizes that stress related.  She notices that she is having more trouble focusing at work.  Is wondering about increasing the Vyvanse.  Trazodone does help with sleep, she notices if she does have more trouble sleeping it is due to stress.  Her mood has been stable.    Carlie  has a past medical history of Anemia; Chronic pain; Eating disorder; H/O multiple concussions; IBS (irritable bowel syndrome); Insomnia; Multiple fractures; and PTSD (post-traumatic stress disorder).  Carlie  has a past surgical history that includes left hip surgery.  Erythromycin  Current Outpatient Prescriptions   Medication Sig Dispense Refill     Lactobacillus rhamnosus GG (CULTURELLE) 10-15 Billion cell capsule Take 1 capsule by mouth daily.       lisdexamfetamine (VYVANSE) 30 MG capsule Take 1 capsule (30 mg total) by mouth every morning. 30 capsule 0     multivitamin therapeutic (THERAGRAN) tablet Take 1 tablet by mouth daily.       OMEGA-3/DHA/EPA/FISH OIL (FISH OIL-OMEGA-3 FATTY ACIDS) 300-1,000 mg capsule Take 2 g by mouth daily.        traZODone (DESYREL) 50 MG tablet Take 2 tablets (100 mg total) by mouth at bedtime. 60 tablet 2     No current facility-administered medications for this encounter.      No family history on file.  Social History     Social History     Marital status:      Spouse name: N/A     Number of children: N/A     Years of education: N/A     Social History Main Topics     Smoking status: Not on file     Smokeless tobacco: Not on file     Alcohol use Not on file     Drug use: Not on file     Sexual activity: Not on file     Other Topics Concern     Not on file     Social History Narrative     No narrative on file       The following portions of the patient's history were reviewed and updated as appropriate: allergies, current medications, past family history, past medical history, past social history, past surgical history and problem list.    Review of Systems  Constitutional: negative  Neurological: positive for headaches and vertigo  Behavioral/Psych: positive for anxiety and sleep disturbance, negative for decreased appetite, depression, fatigue, irritability, loss of interest in favorite activities and mood swings       Objective:   @VS    Mental Status Exam:     Appearance: Patient is well groomed, pleasant and cooperative.  Good eye contact.  No psychomotor agitation.    Speech / Language : Within normal    Associations: appropriate    Alert and oriented X 3:yes    Mood: Euthymic  Affect: Congruent w/content of speech    Suicidal / Homicidal ideation:none  If yes, document safety plan:    Fund of knowledge: good    Thought process:Within normal    Judgement / insight: No Evidence of Impairment    Recent and remote memory: WNL    Attention: Within normal  Concentration: Within normal    Motor status: Gait normal, no involuntary movements.    Scores: NA    Change in medication? Yes    Education    Reviewed risks/benefits of medication      Physical Exam: General appearance: alert, appears stated age,  cooperative and no distress  Neurologic: Mental status: Alert, oriented, thought content appropriate, affect: mood-congruent, thought content exhibits logical connections, when questioned about suicide, the patient expresses no suicidal ideation

## 2021-06-13 NOTE — PROGRESS NOTES
Patient's impression of how medication is working? Feels vyvanse needs to be increased    Compliant with Medication? yes    Side Effects: None    Current Symptoms : Yes    Pain (0-10) Yes  Appetite change No  Negative thoughts No  Alcohol use:no  Drug use No  Mood swings No  Sleep disturbance Yes  Change in interest No  Change in energy No  Change in concentration Yes  Psychosis/Hallucinations No  Anxiety moderate  Sad/depressed mood none

## 2021-06-15 NOTE — PROGRESS NOTES
.  Patient's impression of how medication is working? good    Compliant with Medication? yes    Side Effects: None    Current Symptoms : No    Any Concerns? no    Pain (0-10) Yes  Appetite change No  Sleep disturbance No  Change in energy No  Change in interest No  Change in concentration No  Psychosis/Hallucinations No  Negative thoughts No  Mood swings No  Alcohol use No  Drug use No  Anxiety none  Sad/depressed mood none

## 2021-06-15 NOTE — PROGRESS NOTES
.  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  Insomnia; stable.  Follow-up medication evaluation.  Patient's impression of how medication is working? good  Compliant with medication? yes    Side effects:None       Plan:     Encourage patient to restart therapy.  Once she is feeling better physically, if she would like to have a brief session with OT for cognitive rehab she can call our office and we will set up that order.  We will have patient follow-up in 6 months, she is having more issues with her medication she can call or come in sooner.    Subjective:      HPI: Carlie Montejo is a 42 y.o. female with cognitive and mood disorder secondary to multiple head injuries.  She also suffers from anxiety and insomnia.  She is here for follow-up medication evaluation.  Patient had been stable, however recently she has had some medical issues that have caused her more anxiety, fatigue and generally not feeling well.  She was diagnosed with lip cancer, is on a topical medication that causes some side effects.  She is also suffering from my tooth abscess and is currently on antibiotics.  She was also diagnosed with iron deficiency.  Patient states she feels rundown fatigued and feels her cognition is not working as well.  She is getting back into therapy but would like to restart OT for cognitive rehab once she starts feeling better.  Patient states the medications are working well, she does try to go without the trazodone at night but finds herself grinding her teeth and then having jaw pain in the morning.    Carlie  has a past medical history of Anemia; Chronic pain; Eating disorder; H/O multiple concussions; IBS (irritable bowel syndrome); Insomnia; Multiple fractures; and PTSD (post-traumatic stress disorder).  Carlie  has a past surgical history  that includes left hip surgery.  Erythromycin  Current Outpatient Prescriptions   Medication Sig Dispense Refill     Lactobacillus rhamnosus GG (CULTURELLE) 10-15 Billion cell capsule Take 1 capsule by mouth daily.       lisdexamfetamine (VYVANSE) 40 MG capsule Take 1 capsule (40 mg total) by mouth every morning. 30 capsule 0     multivitamin therapeutic (THERAGRAN) tablet Take 1 tablet by mouth daily.       OMEGA-3/DHA/EPA/FISH OIL (FISH OIL-OMEGA-3 FATTY ACIDS) 300-1,000 mg capsule Take 2 g by mouth daily.       traZODone (DESYREL) 50 MG tablet TAKE 2 TABLETS (100 MG TOTAL) BY MOUTH AT BEDTIME. 60 tablet 3     No current facility-administered medications for this encounter.      No family history on file.  Social History     Social History     Marital status:      Spouse name: N/A     Number of children: N/A     Years of education: N/A     Social History Main Topics     Smoking status: Never Smoker     Smokeless tobacco: Not on file     Alcohol use Yes      Comment: occasional     Drug use: Not on file     Sexual activity: Not on file     Other Topics Concern     Not on file     Social History Narrative       The following portions of the patient's history were reviewed and updated as appropriate: allergies, current medications, past family history, past medical history, past social history, past surgical history and problem list.    Review of Systems  Constitutional: negative  Neurological: positive for memory problems  Behavioral/Psych: positive for anxiety, depression, fatigue and sleep disturbance       Objective:   @VS    Mental Status Exam:     Appearance: Patient is well groomed, pleasant and cooperative.  Good eye contact.  Some tearfulness when discussing medical issues.    Speech / Language : Within normal    Associations: appropriate    Alert and oriented X 3:yes    Mood: Anxious  Affect: Congruent w/content of speech    Suicidal / Homicidal ideation:none  If yes, document safety plan:    Fund of  knowledge: good    Thought process:Within normal    Judgement / insight: No Evidence of Impairment    Recent and remote memory: WNL    Attention: Within normal  Concentration: Within normal    Motor status: Gait normal, no involuntary movements.    Scores: NA    Change in medication? No    Education    Reviewed risks/benefits of medication      Physical Exam: General appearance: alert, appears stated age, cooperative and mild distress  Neurologic: Mental status: Alert, oriented, thought content appropriate, affect: mood-congruent Thought content devoid of any delusions, suicidal, homicidal or obsesional content.

## 2021-06-17 NOTE — TELEPHONE ENCOUNTER
Telephone Encounter by Aleksandra Michel at 5/1/2020 10:18 AM     Author: Aleksandra Michel Service: -- Author Type: --    Filed: 5/1/2020 10:18 AM Encounter Date: 5/1/2020 Status: Signed    : Aleksandra Michel APPROVED:    Approval start date: 4/1/2020  Approval end date:  5/1/2021    Pharmacy has been notified of approval and will contact patient when medication is ready for pickup.

## 2021-06-19 NOTE — PROGRESS NOTES
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  She struggled in the past with insomnia..  She been tried on Ambien as well as some type of benzodiazepine in the past.  So apparently had a history of eating disorder but had been acquiescent for 4 years prior to my first meeting with the patient.  She has a history of lip cancer.  She struggled periodically with mood issues including anxiety and fatigue.  She has been followed in this clinic by the nurse practitioner and was last seen by that nurse practitioner in January 2018.  The nurse practitioner apparently had tried Vyvanse with the patient which the patient stated was very helpful regarding her concentration and her mood and her sleep.  She also was treating the patient with trazodone which was very helpful for sleep.  Prior to contact with this clinic the patient had been followed in [place] through some brain injury services but apparently it never been on antidepressants there.  The patient describes 16 concussions between the year of 2000 and about 2009 but she began having significant mood difficulties in 2014.  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  I saw the patient for the first time in July 2018.      At the patient's last visit she reported that she was doing relatively well.  They did order some OT for cognitive rehab.  Patient is currently on Vyvanse and trazodone.    Current Symptoms: The patient tells me she is doing very well and she does not want any medication changes.  She states her mood is relatively good.  She denies feeling hopeless or helpless or worthless but admits at times she feels \"lonely in my head\".  She is frequently frustrated due to cognitive difficulties and would like to consider an occupational therapy refresher.  This was " considered at the last visit but the patient did not follow through at that time but states now she feels she is ready.  She states her anxiety is fairly well controlled but she does have anxiety if she is under stress.  She has not had any recent suicidal ideation.  She did have some suicidal ideation in the context of eating disorder but never had a plan or thought she just wondered what it would be like to be dead.  No symptoms such as that for over 4 years.  She reports no psychosis.  She denies side effects to the medication.  No new medical issues.         Current Medications:  Current medications were reviewed.  Please see the chart for additional information.    Medication Compliance: Yes    Side Effects to Medications: No      Vitals:  Wt Readings from Last 3 Encounters:   10/23/17 110 lb (49.9 kg)   06/22/17 107 lb (48.5 kg)   03/23/17 106 lb (48.1 kg)     Temp Readings from Last 3 Encounters:   10/23/17 99.1  F (37.3  C) (Oral)     BP Readings from Last 3 Encounters:   10/23/17 98/55   06/22/17 101/62   03/23/17 106/57     Pulse Readings from Last 3 Encounters:   10/23/17 80   06/22/17 75   03/23/17 72       Problem List (Please see medical records):  Active Problems:    * No active hospital problems. *        Mental Status Exam:   Appearance:  The patient was alert, comfortable and calm. No agitation. Not currently in any pain. No evidence of any shortness of breath.  Behavior:  The patient is calm, cooperative, with no agitation or obvious distress. The patient does participate. No restlessness. No reports of any recent behavioral dyscontrol.  Speech:  Sentence structure is intact. Able to dialogue. Answers are consistent and appropriate. Not pressured. Not rambling.  Mood/Affect:  Patient appears alert. No obvious depression or anxiety. No irritability. No lability.  Thought Content:  No evidence of any psychosis. No reports of any recent psychosis.  Suicidal or Homicidal Thoughts:  None apparent or  reported. The patient denies any suicidal or homicidal ideation.   Thought Process/Formulation:  Able to track and follow. No racing thoughts. Not disorganized. Able to participate.  Associations:  Grossly intact. Able to process information.  Fund of Knowledge:  Grossly intact. Please see the therapy notes.  Attention/Concentration:  Attentive. Able to track and follow. Concentration appears grossly intact. Not disorganized.  Insight:  Grossly intact. Please see therapy notes  Judgement:  Grossly intact  Memory:  Grossly intact. Please see therapy notes.  Motor Status:  No recent change reported. No current tremor.  Orientation: Grossly oriented. Please see the therapy notes.    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  We did discuss the possibility of an antidepressant trial, possibly Remeron in the future should mood and/or sleep become problematic again.    Other:   Patient will follow up in 6 months.  She agrees to call or return sooner than that if questions, concerns or problems arise.    Continue with the support of the clinic, reassurance, and redirection. Staff monitoring and ongoing assessments per team plan. Current psychotropic medication appears to represent the minimum effective dosage and appears medically necessary. We will continue to monitor and reassess. This team will utilize appropriate emergency services if necessary. I will make myself available if concerns or problems arise.    Eliecer Bradshaw MD

## 2021-06-19 NOTE — PROGRESS NOTES
Patient's impression of how medication is working? Good, pt is leaving for vacation tomorrow and will run out of her meds on her trip and would like a refill called in for that.     Compliant with Medication? Yes    Side Effects: None    Current Symptoms : Yes    Pain (0-10) No  Appetite change No  Sleep disturbance Yes  Change in energy No  Change in interest No  Change in concentration No  Psychosis/Hallucinations No  Negative thoughts No  Mood swings No  Alcohol use No  Drug use No  Anxiety moderate  Sad/depressed mood none

## 2021-06-23 NOTE — PROGRESS NOTES
Patient's impression of how medication is working? Speech problems, compulsive behaviors, increased anxiety, trouble with memory, spatial awareness, nightmares started back up (feeling stuck in something)    Compliant with Medication? Yes     Side Effects: None    Current Symptoms : Yes    Pain (0-10) No  Appetite change No  Sleep disturbance Yes  Change in energy No  Change in interest No  Change in concentration Yes  Psychosis/Hallucinations No  Negative thoughts Yes  Mood swings Yes  Alcohol use No  Drug use No  Anxiety high  Sad/depressed mood high

## 2021-06-23 NOTE — PROGRESS NOTES
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  She struggled in the past with insomnia..  She been tried on Ambien as well as some type of benzodiazepine in the past.  So apparently had a history of eating disorder but had been acquiescent for 4 years prior to my first meeting with the patient.  She has a history of lip cancer.  She struggled periodically with mood issues including anxiety and fatigue.  She has been followed in this clinic by the nurse practitioner and was last seen by that nurse practitioner in January 2018.  The nurse practitioner apparently had tried Vyvanse with the patient which the patient stated was very helpful regarding her concentration and her mood and her sleep.  She also was treating the patient with trazodone which was very helpful for sleep.  Prior to contact with this clinic the patient had been followed in [place] through some brain injury services but apparently it never been on antidepressants there.  The patient describes 16 concussions between the year of 2000 and about 2009 but she began having significant mood difficulties in 2014.  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      I saw the patient for the first time in July 2018.  Prior to that visit and when she saw the nurse practitioner she reported that she was doing relatively well.  They did order some OT for cognitive rehab.  Patient is currently on Vyvanse and trazodone.      In July 2018 we did not make any medication changes although considered an antidepressant trial.    Current Symptoms:   Presents today stating she is struggling a bit.  She thought about calling in for an earlier appointment prior to the holidays.  She states she has a new job and she likes the job but the move was very stressful.  She states she is  "having a bit more difficulty with concentration and focus in all aspects of her life.  She states she is not completing task and feels like she is often giving up on things which is new for her.  She is more detached from relationships she is less social.  She describes more mental fatigue.  She states that she is not exactly hopeless but feels like there is no improvement.  She also reports more compulsive thinking about food.  She has not reverted back to any eating disorder behaviors but struggles with more thoughts about food on a daily basis.  She reports she is now wearing hats and glasses and when she looks in the mirror she sees \"a monster\".  She denies that she sees yourself looking too thin or too fat but states she sees herself as somebody she does not like.  She denies having any desire to be dead or thoughts of suicide.  There is been no psychosis.  No new medical issues.  She states sleep is fairly good and she occasionally takes the trazodone.  No change in appetite.    We spent some time talking about treatment options.  She is willing to try an antidepressant in addition and I did add some low-dose Effexor.  Risks and benefits were discussed.  We will also set her up with an individual therapist and an OT reassessment for re-addressing compensatory strategies.         Current Medications:  Current medications were reviewed.  Please see the chart for additional information.    Medication Compliance: Yes    Side Effects to Medications: No      Vitals:  Wt Readings from Last 3 Encounters:   10/23/17 110 lb (49.9 kg)   06/22/17 107 lb (48.5 kg)   03/23/17 106 lb (48.1 kg)     Temp Readings from Last 3 Encounters:   10/23/17 99.1  F (37.3  C) (Oral)     BP Readings from Last 3 Encounters:   10/23/17 98/55   06/22/17 101/62   03/23/17 106/57     Pulse Readings from Last 3 Encounters:   10/23/17 80   06/22/17 75   03/23/17 72       Problem List (Please see medical records):  The records were " reviewed.      Mental Status Exam:   Appearance:  Patient appears slow and flat.  He is able to smile.  No obvious shortness of breath. No obvious pain at this time.  Behavior: Slow.  Pleasant cooperative and it does not appear to be evasive or defensive.  Not agitated. No restlessness.  No reports of any significant recent behavioral dyscontrol.  Speech: Somewhat monotone but able to dialogue and initiate.  Not pressured or rambling.  Answers are consistent and appropriate.  Mood/Affect:  Flat, slow, somewhat depressed.  Able to smile and pleasant.  No current anxiety or agitation. Not currently labile.  Thought Content:  No evidence of psychosis. No recent reported psychosis.  Suicidal or Homicidal Thoughts:  None apparent or reported.   Thought Process/Formulation:  Slow. [place]. No evidence of any racing thoughts.  Able to track and follow.  Associations: No obvious loosening of associations.  Slow. [place].  Fund of Knowledge:  Please see the therapy notes. No apparent recent change.  Attention/Concentration:  Flat slow.  Able to follow some simple conversation. No apparent recent change.  Insight: No apparent recent change.  Adequate.  Judgement: No apparent recent change.  Adequate.  Memory:   Slow.  Adequate.  Motor Status:   No current tremor.  Orientation: No reports of any recent change.  Grossly oriented.    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  Risks and benefits were discussed.  We will continue with the other medications as ordered.    Other:   I have ordered individual therapy as well as an OT reevaluation.  Patient will follow up in the medication clinic in 3 months.  She agrees to call or return sooner than that if  questions, concerns or problems arise.    Continue with the support of the clinic, reassurance, and redirection. Staff monitoring and ongoing assessments per team plan. Current psychotropic medication appears to represent the minimum effective dosage and appears medically necessary. We will continue to monitor and reassess. This team will utilize appropriate emergency services if necessary. I will make myself available if concerns or problems arise.    Eliecer Bradshaw MD

## 2021-06-25 NOTE — TELEPHONE ENCOUNTER
Verify that it is okay to dispense patient states she is going on vacation, needs this to cover while she is gone.     Prescription written without refills for 3/14/2019. Pharmacy agrees to dispense.

## 2021-07-03 NOTE — ADDENDUM NOTE
Addendum Note by Irvin Leigh MA at 6/22/2017  2:41 PM     Author: Irvin Leigh MA Service: -- Author Type: Medical Assistant    Filed: 6/22/2017  2:41 PM Date of Service: 6/22/2017  2:41 PM Status: Signed    : Irvin Leigh MA (Medical Assistant)    Encounter addended by: Irvin Leigh MA on: 6/22/2017  2:41 PM<BR>     Actions taken: Charge Capture section accepted

## 2021-07-03 NOTE — ADDENDUM NOTE
Addendum Note by Silva Carranza RN at 1/18/2018 11:59 PM     Author: Silva Carranza RN Service: -- Author Type: Registered Nurse    Filed: 1/23/2018 11:03 AM Date of Service: 1/18/2018 11:59 PM Status: Signed    : Silva Carranza RN (Registered Nurse)    Encounter addended by: Silva Carranza RN on: 1/23/2018 11:03 AM<BR>     Actions taken: Visit diagnoses modified, Charge Capture section accepted

## 2021-07-03 NOTE — ADDENDUM NOTE
Addendum Note by Bridget Haile at 5/8/2020  3:30 PM     Author: Bridget Haile Service: -- Author Type: --    Filed: 6/3/2020  1:33 PM Date of Service: 5/8/2020  3:30 PM Status: Signed    : Bridget Haile    Encounter addended by: Bridget Haile on: 6/3/2020  1:33 PM      Actions taken: Charge Capture section accepted

## 2021-07-03 NOTE — ADDENDUM NOTE
Addendum Note by Bhavani Deleon CMA at 5/2/2019 11:59 PM     Author: Bhavani Deleon CMA Service: -- Author Type: Certified Medical Assistant    Filed: 6/12/2019 10:11 AM Date of Service: 5/2/2019 11:59 PM Status: Signed    : Bhavani Deleon CMA (Certified Medical Assistant)    Encounter addended by: Bhavani Deleon CMA on: 6/12/2019 10:11 AM      Actions taken: Charge Capture section accepted

## 2021-07-03 NOTE — ADDENDUM NOTE
Addendum Note by Irvin Leigh MA at 3/23/2017 11:39 AM     Author: Irvin Leigh MA Service: -- Author Type: Medical Assistant    Filed: 3/23/2017 11:39 AM Date of Service: 3/23/2017 11:39 AM Status: Signed    : Irvin Leigh MA (Medical Assistant)    Encounter addended by: Irvin Leigh MA on: 3/23/2017 11:39 AM<BR>     Actions taken: Charge Capture section accepted

## 2021-07-03 NOTE — ADDENDUM NOTE
Addendum Note by Madelyn Preciado CMA at 1/15/2019  9:43 AM     Author: Madelyn Preciado CMA Service: -- Author Type: Certified Medical Assistant    Filed: 1/15/2019  9:43 AM Date of Service: 1/15/2019  9:43 AM Status: Signed    : Madelyn Preciado CMA (Certified Medical Assistant)    Encounter addended by: Madelyn Preciado CMA on: 1/15/2019  9:43 AM      Actions taken: Charge Capture section accepted

## 2021-07-03 NOTE — ADDENDUM NOTE
Addendum Note by Silva Carranza RN at 10/5/2017 11:59 PM     Author: Silva Carranza RN Service: -- Author Type: Registered Nurse    Filed: 10/9/2017  8:49 AM Date of Service: 10/5/2017 11:59 PM Status: Signed    : Silva Carranza RN (Registered Nurse)    Encounter addended by: Silva Carranza RN on: 10/9/2017  8:49 AM<BR>     Actions taken: Charge Capture section accepted

## 2021-07-24 ENCOUNTER — HEALTH MAINTENANCE LETTER (OUTPATIENT)
Age: 46
End: 2021-07-24

## 2021-09-18 ENCOUNTER — HEALTH MAINTENANCE LETTER (OUTPATIENT)
Age: 46
End: 2021-09-18

## 2022-01-08 ENCOUNTER — HEALTH MAINTENANCE LETTER (OUTPATIENT)
Age: 47
End: 2022-01-08

## 2022-08-14 ENCOUNTER — HEALTH MAINTENANCE LETTER (OUTPATIENT)
Age: 47
End: 2022-08-14

## 2022-11-19 ENCOUNTER — HEALTH MAINTENANCE LETTER (OUTPATIENT)
Age: 47
End: 2022-11-19

## 2023-04-06 ENCOUNTER — OFFICE VISIT (OUTPATIENT)
Facility: LOCATION | Age: 48
End: 2023-04-06
Payer: COMMERCIAL

## 2023-04-06 DIAGNOSIS — H52.4 PRESBYOPIA: Primary | ICD-10-CM

## 2023-04-06 DIAGNOSIS — H25.13 AGE-RELATED NUCLEAR CATARACT, BILATERAL: ICD-10-CM

## 2023-04-06 DIAGNOSIS — H04.123 TEAR FILM INSUFFICIENCY OF BILATERAL LACRIMAL GLANDS: ICD-10-CM

## 2023-04-06 PROCEDURE — 92004 COMPRE OPH EXAM NEW PT 1/>: CPT | Performed by: OPTOMETRIST

## 2023-04-06 PROCEDURE — 92310 CONTACT LENS FITTING OU: CPT | Performed by: OPTOMETRIST

## 2023-04-06 ASSESSMENT — VISUAL ACUITY
OS: 20/20
OD: 20/20

## 2023-04-06 ASSESSMENT — INTRAOCULAR PRESSURE
OD: 14
OS: 13

## 2023-04-06 NOTE — IMPRESSION/PLAN
Impression: Presbyopia: H52.4. Plan: Glasses prescription helps improve vision. Glasses Rx provided as requested. Pt happy with comfort of [employer] branded/monthly CLs. Will order CL trials. Patient transitioning from MF to SV distance CLs. Will set up 1 week CL f/u. *Discussed proper CL care and hygiene.

## 2023-04-09 ENCOUNTER — HEALTH MAINTENANCE LETTER (OUTPATIENT)
Age: 48
End: 2023-04-09

## 2023-09-10 ENCOUNTER — HEALTH MAINTENANCE LETTER (OUTPATIENT)
Age: 48
End: 2023-09-10